# Patient Record
Sex: MALE | Employment: OTHER | URBAN - METROPOLITAN AREA
[De-identification: names, ages, dates, MRNs, and addresses within clinical notes are randomized per-mention and may not be internally consistent; named-entity substitution may affect disease eponyms.]

---

## 2019-06-12 NOTE — PROGRESS NOTES
HISTORY OF PRESENT ILLNESS  Brendan Sorensen is a 52 y.o. male. Mr. Owen Pena Sons presents to establish care and reports that he was hospitalized at [facility] and hospitals this past March. 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 He is presently completing physical therapy and Occupational Therapy at a [facility] facility. He also has a history of hypertension diabetes and reports that these were previously uncontrolled secondary to noncompliance with medication. Unfortunately access to his [facility] records is presently not available. Establish Care   The history is provided by the patient. Pertinent negatives include no chest pain, no abdominal pain, no headaches and no shortness of breath. Mr#: <[id number]>      Past Medical History:   Diagnosis Date    Chronic pain     right shoulder pain    Diabetes ([facility] [address]     Hypercholesterolemia     Hypertension     Stroke [facility])        History reviewed. No pertinent surgical history. Family History   Problem Relation Age of Onset    Hypertension Father     Diabetes Father     Diabetes Paternal Grandmother        No Known Allergies    Social History     Tobacco Use   Smoking Status Former Smoker   Smokeless Tobacco Never Used       Social History     Substance and Sexual Activity   Alcohol Use Not on file       Health Maintenance Review:  Tetanus immunization -  Influenza immunization -        There is no problem list on file for this patient. Current Outpatient Medications:     aspirin (ASPIRIN) 325 mg tablet, Take 325 mg by mouth daily. , Disp: , Rfl:     metformin HCl (METFORMIN PO), Take 500 mg by mouth two (2) times a day., Disp: , Rfl:     simvastatin (ZOCOR) 40 mg tablet, Take  by mouth nightly., Disp: , Rfl:     lisinopril (PRINIVIL, ZESTRIL) 40 mg tablet, Take 40 mg by mouth daily. , Disp: , Rfl:     glimepiride (AMARYL) 4 mg tablet, Take  by mouth every morning., Disp: , Rfl:         Review of Systems Constitutional: Negative for chills, fever and weight loss. HENT: Negative for congestion, hearing loss and sore throat. Eyes: Negative for blurred vision and double vision. Wears corrective lenses, no recent eye exam   Respiratory: Negative for cough, shortness of breath and wheezing. Cardiovascular: Negative for chest pain, palpitations and leg swelling. Gastrointestinal: Negative for abdominal pain, blood in stool, constipation, diarrhea, heartburn, melena, nausea and vomiting. Genitourinary: Negative for dysuria and urgency. Musculoskeletal: Positive for joint pain (right shoulder pain-hurt it 6-7 years ago, much worse since stroke 3 months ago). Negative for myalgias. Skin: Negative for itching and rash. Neurological: Positive for focal weakness (right arm and leg weakness, finishing PT, OT). Negative for dizziness, tingling, sensory change and headaches. Endo/Heme/Allergies: Negative for environmental allergies. Psychiatric/Behavioral: Negative for depression. The patient is not nervous/anxious and does not have insomnia. Visit Vitals  /80 (BP 1 Location: Left arm, BP Patient Position: Sitting)   Pulse 77   Temp 97.5 °F (36.4 °C) (Oral)   Resp 14   Ht 5' 9.17\" (1.757 m)   Wt 171 lb 12.8 oz (77.9 kg)   SpO2 96%   BMI 25.24 kg/m²       Physical Exam   Constitutional: He is oriented to person, place, and time. He appears well-developed and well-nourished. HENT:   Head: Normocephalic. Right Ear: Tympanic membrane and ear canal normal.   Left Ear: Tympanic membrane and ear canal normal.   Mouth/Throat: Oropharynx is clear and moist.   Eyes: Pupils are equal, round, and reactive to light. Conjunctivae and EOM are normal.   Neck: Neck supple. Cardiovascular: Normal rate, regular rhythm, normal heart sounds and intact distal pulses. Pulmonary/Chest: Effort normal and breath sounds normal.   Abdominal: Soft. Bowel sounds are normal. There is no tenderness. Musculoskeletal: He exhibits tenderness. He exhibits no edema. Right shoulder with full range of motion but with discomfort on full extension and with internal and external rotation well in abduction. There is also right upper parascapular tenderness. Neurological: He is alert and oriented to person, place, and time. He displays abnormal reflex ( Diminished deep tendon reflex at the left patellar tendon). Coordination normal.   Proximal and distal muscle strength right upper and lower extremity approximately 4/5   Skin: Skin is warm and dry. Psychiatric: He has a normal mood and affect. His behavior is normal.   Nursing note and vitals reviewed. Diabetic foot exam performed by Cherie Jaramillo MD     Measurement  Response Nurse Comment Physician Comment   Monofilament  R - normal sensation with micro filament  L - normal sensation with micro filament     Pulse DP R - 2+ (normal)  L - 2+ (normal)     Pulse TP R - 2+ (normal)  L - 1+ (weak)     Structural deformity R - None  L - None     Skin Integrity / Deformity R - None  L - None              Results for Otilio Rizzo (MRN <[id number]>) as of 6/13/2019 12:48   Ref. Range 6/13/2019 11:25   Hemoglobin A1c (POC) Latest Units: % 7.1       ASSESSMENT and PLAN    ICD-10-CM ICD-9-CM    1. Encounter to establish care Z76.89 V65.8    2. Type 2 diabetes mellitus without complication, without long-term current use of insulin ([facility]) E11.9 250.00 AMB POC HEMOGLOBIN A1C      MICROALBUMIN, UR, RAND W/ MICROALB/CREAT RATIO       DIABETES FOOT EXAM   3. Essential hypertension I10 401.9 LIPID PANEL      METABOLIC PANEL, BASIC   4. History of stroke Z86.73 V12.54    5. Impingement syndrome of right shoulder M75.41 726.2 REFERRAL TO PHYSICAL THERAPY      ibuprofen (MOTRIN) 600 mg tablet   6. Upper back pain on right side M54.9 724.5 REFERRAL TO PHYSICAL THERAPY      ibuprofen (MOTRIN) 600 mg tablet   7.  Encounter for immunization Z23 V03.89 TETANUS, DIPHTHERIA TOXOIDS AND ACELLULAR PERTUSSIS VACCINE (TDAP), IN INDIVIDS. >=7, IM      PNEUMOCOCCAL POLYSACCHARIDE VACCINE, 23-VALENT, ADULT OR IMMUNOSUPPRESSED PT DOSE,   Return for fasting lab, further disposition pending lab results. Continue current medications including lisinopril, metformin, glimepiride and simvastatin. Call back if names on medication bottles do not match these. Bring medication bottles to next appointment. Physical therapy referral.  Schedule a diabetic eye exam, have results sent here. Return for follow-up in 1 month, sooner with any problems.

## 2019-06-13 ENCOUNTER — OFFICE VISIT (OUTPATIENT)
Dept: FAMILY MEDICINE CLINIC | Age: 49
End: 2019-06-13

## 2019-06-13 VITALS
TEMPERATURE: 97.5 F | SYSTOLIC BLOOD PRESSURE: 102 MMHG | DIASTOLIC BLOOD PRESSURE: 80 MMHG | WEIGHT: 171.8 LBS | RESPIRATION RATE: 14 BRPM | BODY MASS INDEX: 25.45 KG/M2 | HEIGHT: 69 IN | OXYGEN SATURATION: 96 % | HEART RATE: 77 BPM

## 2019-06-13 DIAGNOSIS — Z86.73 HISTORY OF STROKE: ICD-10-CM

## 2019-06-13 DIAGNOSIS — Z23 ENCOUNTER FOR IMMUNIZATION: ICD-10-CM

## 2019-06-13 DIAGNOSIS — M75.41 IMPINGEMENT SYNDROME OF RIGHT SHOULDER: ICD-10-CM

## 2019-06-13 DIAGNOSIS — E11.9 TYPE 2 DIABETES MELLITUS WITHOUT COMPLICATION, WITHOUT LONG-TERM CURRENT USE OF INSULIN (HCC): ICD-10-CM

## 2019-06-13 DIAGNOSIS — I10 ESSENTIAL HYPERTENSION: ICD-10-CM

## 2019-06-13 DIAGNOSIS — M54.9 UPPER BACK PAIN ON RIGHT SIDE: ICD-10-CM

## 2019-06-13 DIAGNOSIS — Z76.89 ENCOUNTER TO ESTABLISH CARE: Primary | ICD-10-CM

## 2019-06-13 LAB — HBA1C MFR BLD HPLC: 7.1 %

## 2019-06-13 RX ORDER — IBUPROFEN 600 MG/1
600 TABLET ORAL
Qty: 45 TAB | Refills: 1 | Status: SHIPPED | OUTPATIENT
Start: 2019-06-13 | End: 2019-10-14

## 2019-06-13 RX ORDER — GLYBURIDE 5 MG/1
TABLET ORAL
COMMUNITY
End: 2019-06-13

## 2019-06-13 RX ORDER — GLIMEPIRIDE 4 MG/1
TABLET ORAL
COMMUNITY
End: 2019-07-11 | Stop reason: SDUPTHER

## 2019-06-13 RX ORDER — SIMVASTATIN 40 MG/1
TABLET, FILM COATED ORAL
COMMUNITY
End: 2019-07-11 | Stop reason: SDUPTHER

## 2019-06-13 RX ORDER — ASPIRIN 325 MG
325 TABLET ORAL DAILY
COMMUNITY
End: 2021-04-07

## 2019-06-13 RX ORDER — LISINOPRIL 40 MG/1
40 TABLET ORAL DAILY
COMMUNITY
End: 2019-07-11 | Stop reason: SDUPTHER

## 2019-06-13 NOTE — PROGRESS NOTES
Felicia Chong is a 52 y.o. male (: 1970) presenting to address:    Chief Complaint   Patient presents with   [facility]    Shoulder Pain     right shoulder old injury       Vitals:    19 1059   BP: 102/80   Pulse: 77   Resp: 14   Temp: 97.5 °F (36.4 °C)   TempSrc: Oral   SpO2: 96%   Weight: 171 lb 12.8 oz (77.9 kg)   Height: 5' 9.17\" (1.757 m)   PainSc:   8   PainLoc: Shoulder       Hearing/Vision:   No exam data present    Learning Assessment:     Learning Assessment 2019   PRIMARY LEARNER Patient   HIGHEST LEVEL OF EDUCATION - PRIMARY LEARNER  SOME COLLEGE   BARRIERS PRIMARY LEARNER NONE   CO-LEARNER CAREGIVER No   PRIMARY LANGUAGE ENGLISH    NEED No   LEARNER PREFERENCE PRIMARY DEMONSTRATION   ANSWERED BY patient   RELATIONSHIP SELF     Depression Screening:     3 most recent PHQ Screens 2019   Little interest or pleasure in doing things Not at all   Feeling down, depressed, irritable, or hopeless Not at all   Total Score PHQ 2 0     Fall Risk Assessment:     Fall Risk Assessment, last 12 mths 2019   Able to walk? Yes   Fall in past 12 months? No     Abuse Screening:     Abuse Screening Questionnaire 2019   Do you ever feel afraid of your partner? N   Are you in a relationship with someone who physically or mentally threatens you? N   Is it safe for you to go home? Y     Coordination of Care Questionaire:   1. Have you been to the ER, urgent care clinic since your last visit? Hospitalized since your last visit? YES; [facility] for stroke, 2019    2. Have you seen or consulted any other health care providers outside of the [address] [place] [address] since your last visit? Include any pap smears or colon screening. YES; physical therapy, speech and OT therapy    Advanced Directive:   1. Do you have an Advanced Directive? NO    2. Would you like information on Advanced Directives?  NO        Immunization/s of Tdap and Pneumovax 23 was administered 2019 by John Boles LPN/verified by Ramírez Enriquez LPN. Patient tolerated procedure well. No reactions noted.

## 2019-06-13 NOTE — PATIENT INSTRUCTIONS
Return for fasting lab, further disposition pending lab results. Continue current medications including lisinopril, metformin, glimepiride and simvastatin. Call back if names on medication bottles do not match these. Bring medication bottles to next appointment. Physical therapy referral. 
Schedule a diabetic eye exam, have results sent here. Return for follow-up in 1 month, sooner with any problems. Learning About Diabetes Food Guidelines Your Care Instructions Meal planning is important to manage diabetes. It helps keep your blood sugar at a target level (which you set with your doctor). You don't have to eat special foods. You can eat what your family eats, including sweets once in a while. But you do have to pay attention to how often you eat and how much you eat of certain foods. You may want to work with a dietitian or a certified diabetes educator (CDE) to help you plan meals and snacks. A dietitian or CDE can also help you lose weight if that is one of your goals. What should you know about eating carbs? Managing the amount of carbohydrate (carbs) you eat is an important part of healthy meals when you have diabetes. Carbohydrate is found in many foods. · Learn which foods have carbs. And learn the amounts of carbs in different foods. ? Bread, cereal, pasta, and rice have about 15 grams of carbs in a serving. A serving is 1 slice of bread (1 ounce), ½ cup of cooked cereal, or 1/3 cup of cooked pasta or rice. ? Fruits have 15 grams of carbs in a serving. A serving is 1 small fresh fruit, such as an apple or orange; ½ of a banana; ½ cup of cooked or canned fruit; ½ cup of fruit juice; 1 cup of melon or raspberries; or 2 tablespoons of dried fruit. ? Milk and no-sugar-added yogurt have 15 grams of carbs in a serving. A serving is 1 cup of milk or 2/3 cup of no-sugar-added yogurt. ? Starchy vegetables have 15 grams of carbs in a serving.  A serving is ½ cup of mashed potatoes or sweet potato; 1 cup winter squash; ½ of a small baked potato; ½ cup of cooked beans; or ½ cup cooked corn or green peas. · Learn how much carbs to eat each day and at each meal. A dietitian or CDE can teach you how to keep track of the amount of carbs you eat. This is called carbohydrate counting. · If you are not sure how to count carbohydrate grams, use the Plate Method to plan meals. It is a good, quick way to make sure that you have a balanced meal. It also helps you spread carbs throughout the day. ? Divide your plate by types of foods. Put non-starchy vegetables on half the plate, meat or other protein food on one-quarter of the plate, and a grain or starchy vegetable in the final quarter of the plate. To this you can add a small piece of fruit and 1 cup of milk or yogurt, depending on how many carbs you are supposed to eat at a meal. 
· Try to eat about the same amount of carbs at each meal. Do not \"save up\" your daily allowance of carbs to eat at one meal. 
· Proteins have very little or no carbs per serving. Examples of proteins are beef, chicken, turkey, fish, eggs, tofu, cheese, cottage cheese, and peanut butter. A serving size of meat is 3 ounces, which is about the size of a deck of cards. Examples of meat substitute serving sizes (equal to 1 ounce of meat) are 1/4 cup of cottage cheese, 1 egg, 1 tablespoon of peanut butter, and ½ cup of tofu. How can you eat out and still eat healthy? · Learn to estimate the serving sizes of foods that have carbohydrate. If you measure food at home, it will be easier to estimate the amount in a serving of restaurant food. · If the meal you order has too much carbohydrate (such as potatoes, corn, or baked beans), ask to have a low-carbohydrate food instead. Ask for a salad or green vegetables. · If you use insulin, check your blood sugar before and after eating out to help you plan how much to eat in the future. · If you eat more carbohydrate at a meal than you had planned, take a walk or do other exercise. This will help lower your blood sugar. What else should you know? · Limit saturated fat, such as the fat from meat and dairy products. This is a healthy choice because people who have diabetes are at higher risk of heart disease. So choose lean cuts of meat and nonfat or low-fat dairy products. Use olive or canola oil instead of butter or shortening when cooking. · Don't skip meals. Your blood sugar may drop too low if you skip meals and take insulin or certain medicines for diabetes. · Check with your doctor before you drink alcohol. Alcohol can cause your blood sugar to drop too low. Alcohol can also cause a bad reaction if you take certain diabetes medicines. Follow-up care is a key part of your treatment and safety. Be sure to make and go to all appointments, and call your doctor if you are having problems. It's also a good idea to know your test results and keep a list of the medicines you take. Where can you learn more? Go to http://lebron-judy.info/. Enter N089 in the search box to learn more about \"Learning About Diabetes Food Guidelines. \" Current as of: July 25, 2018 Content Version: 11.9 © 3086-3117 [employer]. Care instructions adapted under license by [employer] (which disclaims liability or warranty for this information). If you have questions about a medical condition or this instruction, always ask your healthcare professional. [address] any warranty or liability for your use of this information. Counting Carbohydrates: Care Instructions Your Care Instructions You don't have to eat special foods when you have diabetes. You just have to be careful to eat healthy foods. Carbohydrates (carbs) raise blood sugar higher and quicker than any other nutrient.  Carbs are found in desserts, breads and cereals, and fruit. They're also in starchy vegetables. These include potatoes, corn, and grains such as rice and pasta. Carbs are also in milk and yogurt. The more carbs you eat at one time, the higher your blood sugar will rise. Spreading carbs all through the day helps keep your blood sugar levels within your target range. Counting carbs is one of the best ways to keep your blood sugar under control. If you use insulin, counting carbs helps you match the right amount of insulin to the number of grams of carbs in a meal. Then you can change your diet and insulin dose as needed. Testing your blood sugar several times a day can help you learn how carbs affect your blood sugar. A registered dietitian or certified diabetes educator can help you plan meals and snacks. Follow-up care is a key part of your treatment and safety. Be sure to make and go to all appointments, and call your doctor if you are having problems. It's also a good idea to know your test results and keep a list of the medicines you take. How can you care for yourself at home? Know your daily amount of carbohydrates Your daily amount depends on several things, such as your weight, how active you are, which diabetes medicines you take, and what your goals are for your blood sugar levels. A registered dietitian or certified diabetes educator can help you plan how many carbs to include in each meal and snack. For most adults, a guideline for the daily amount of carbs is: · 45 to 60 grams at each meal. That's about the same as 3 to 4 carbohydrate servings. · 15 to 20 grams at each snack. That's about the same as 1 carbohydrate serving. Count carbs Counting carbs lets you know how much rapid-acting insulin to take before you eat. If you use an insulin pump, you get a constant rate of insulin during the day. So the pump must be programmed at meals. This gives you extra insulin to cover the rise in blood sugar after meals. If you take insulin: 
· Learn your own insulin-to-carb ratio. You and your diabetes health professional will figure out the ratio. You can do this by testing your blood sugar after meals. For example, you may need a certain amount of insulin for every 15 grams of carbs. · Add up the carb grams in a meal. Then you can figure out how many units of insulin to take based on your insulin-to-carb ratio. · Exercise lowers blood sugar. You can use less insulin than you would if you were not doing exercise. Keep in mind that timing matters. If you exercise within 1 hour after a meal, your body may need less insulin for that meal than it would if you exercised 3 hours after the meal. Test your blood sugar to find out how exercise affects your need for insulin. If you do or don't take insulin: 
· Look at labels on packaged foods. This can tell you how many carbs are in a serving. You can also use guides from the American Diabetes Association. · Be aware of portions, or serving sizes. If a package has two servings and you eat the whole package, you need to double the number of grams of carbohydrate listed for one serving. · Protein, fat, and fiber do not raise blood sugar as much as carbs do. If you eat a lot of these nutrients in a meal, your blood sugar will rise more slowly than it would otherwise. Eat from all food groups · Eat at least three meals a day. · Plan meals to include food from all the food groups. The food groups include grains, fruits, dairy, proteins, and vegetables. · Talk to your dietitian or diabetes educator about ways to add limited amounts of sweets into your meal plan. · If you drink alcohol, talk to your doctor. It may not be recommended when you are taking certain diabetes medicines. Where can you learn more? Go to http://lebron-judy.info/. Enter T818 in the search box to learn more about \"Counting Carbohydrates: Care Instructions. \" Current as of: July 25, 2018 Content Version: 11.9 © 1380-1816 [employer]. Care instructions adapted under license by [employer] (which disclaims liability or warranty for this information). If you have questions about a medical condition or this instruction, always ask your healthcare professional. [address] any warranty or liability for your use of this information.

## 2019-06-18 LAB
ANION GAP SERPL CALC-SCNC: 12 MMOL/L
BUN SERPL-MCNC: 14 MG/DL (ref 6–22)
CALCIUM SERPL-MCNC: 9.5 MG/DL (ref 8.4–10.4)
CHLORIDE SERPL-SCNC: 103 MMOL/L (ref 98–110)
CHOLEST SERPL-MCNC: 129 MG/DL (ref 110–200)
CO2 SERPL-SCNC: 25 MMOL/L (ref 20–32)
CREAT SERPL-MCNC: 0.8 MG/DL (ref 0.5–1.2)
GFRAA, 66117: >60
GFRNA, 66118: >60
GLUCOSE SERPL-MCNC: 174 MG/DL (ref 70–99)
HDLC SERPL-MCNC: 29 MG/DL (ref 40–59)
HDLC SERPL-MCNC: 4.4 MG/DL (ref 0–5)
LDLC SERPL CALC-MCNC: 71 MG/DL (ref 50–99)
POTASSIUM SERPL-SCNC: 4.6 MMOL/L (ref 3.5–5.5)
SODIUM SERPL-SCNC: 140 MMOL/L (ref 133–145)
TRIGL SERPL-MCNC: 146 MG/DL (ref 40–149)
VLDLC SERPL CALC-MCNC: 29 MG/DL (ref 8–30)

## 2019-06-19 NOTE — PROGRESS NOTES
Please advise that lab results show cholesterol levels are satisfactory on current medication which should be continued. Still need a urine specimen for urine microalbumin.

## 2019-07-10 PROBLEM — Z86.73 HISTORY OF STROKE: Status: ACTIVE | Noted: 2019-07-10

## 2019-07-10 PROBLEM — E11.9 TYPE 2 DIABETES MELLITUS WITHOUT COMPLICATION, WITHOUT LONG-TERM CURRENT USE OF INSULIN (HCC): Status: ACTIVE | Noted: 2019-07-10

## 2019-07-10 PROBLEM — I10 ESSENTIAL HYPERTENSION: Status: ACTIVE | Noted: 2019-07-10

## 2019-07-10 NOTE — PROGRESS NOTES
HISTORY OF PRESENT ILLNESS  Jim Joyner is a 52 y.o. male. Mr. Jodie Blackmon was seen for the first time on 4/13/2019 with a prior history of hospitalization for stroke 4 months ago and with a history of type 2 diabetes and hypertension. Diabetes was found to be in reasonable control with a hemoglobin A1c of 7.1%. He also reported right shoulder pain and his exam was consistent with shoulder impingement syndrome. He was referred for physical therapy. Neurologic Problem   The history is provided by the patient and medical records. This is a new problem. Primary symptoms include focal weakness (some residual right side weakness). Pertinent negatives include no chest pain and no headaches. Diabetes   The history is provided by the patient and medical records. This is a chronic problem. Pertinent negatives include no chest pain and no headaches. Review of Systems   Constitutional: Negative for fever and weight loss. Eyes: Negative for blurred vision. Cardiovascular: Negative for chest pain and palpitations. Genitourinary: Negative for frequency. Musculoskeletal: Positive for joint pain ( Right shoulder pain x 7 years after pushing a door, more frequent and intense since stroke). Neurological: Positive for speech change (some residual slurring of speech) and focal weakness (some residual right side weakness). Negative for dizziness, tingling and headaches. Difficulty with fine motor control right hand   Endo/Heme/Allergies: Negative for polydipsia. Visit Vitals  /80 (BP 1 Location: Left arm, BP Patient Position: Sitting)   Pulse 85   Temp 97.4 °F (36.3 °C) (Oral)   Resp 14   Ht 5' 9.17\" (1.757 m)   Wt 173 lb 12.8 oz (78.8 kg)   SpO2 98%   BMI 25.54 kg/m²       Physical Exam   Constitutional: He is oriented to person, place, and time. He appears well-developed and well-nourished. HENT:   Head: Normocephalic. Eyes: EOM are normal.   Neck: Neck supple.    Cardiovascular: Normal rate, regular rhythm and normal heart sounds. Pulmonary/Chest: Effort normal and breath sounds normal.   Musculoskeletal: He exhibits no edema. Right shoulder non tender with full range of motion with pain on extension. Rotator cuff muscle group strength difficult to assess against resistance in the context of recent stroke. Neurological: He is alert and oriented to person, place, and time. Skin: Skin is warm and dry. Psychiatric: He has a normal mood and affect. His behavior is normal.   Nursing note and vitals reviewed. Results for Tushar Christianson (MRN <[id number]>) as of 7/10/2019 07:31   Ref. Range 6/13/2019 11:25 6/18/2019 07:31   Sodium Latest Ref Range: 133 - 145 mmol/L  140   Potassium Latest Ref Range: 3.5 - 5.5 mmol/L  4.6   Chloride Latest Ref Range: 98 - 110 mmol/L  103   CO2 Latest Ref Range: 20 - 32 mmol/L  25   Anion gap Latest Units: mmol/L  12.0   Glucose Latest Ref Range: 70 - 99 mg/dL  174 (H)   BUN Latest Ref Range: 6 - 22 mg/dL  14   Creatinine Latest Ref Range: 0.5 - 1.2 mg/dL  0.8   Calcium Latest Ref Range: 8.4 - 10.4 mg/dL  9.5   Triglyceride Latest Ref Range: 40 - 149 mg/dL  146   Cholesterol, total Latest Ref Range: 110 - 200 mg/dL  129   HDL Cholesterol Latest Ref Range: 40 - 59 mg/dL  29 (L)   CHOLESTEROL/HDL Latest Ref Range: 0.0 - 5.0   4.4   LDL, calculated Latest Ref Range: 50 - 99 mg/dL  71   VLDL, calculated Latest Ref Range: 8 - 30 mg/dL  29   Hemoglobin A1c (POC) Latest Units: % 7.1    Results for Tushar Christianson (MRN <[id number]>) as of 7/11/2019 07:59   Ref. Range 7/11/2019 07:38   Microalbumin urine (POC) Latest Units: MG/L 30   Microalbumin/creat ratio (POC) Latest Ref Range: <30 MG/G <30     ASSESSMENT and PLAN    ICD-10-CM ICD-9-CM    1.  Type 2 diabetes mellitus without complication, without long-term current use of insulin ([facility]) E11.9 250.00 AMB POC URINE, MICROALBUMIN, SEMIQUANTITATIVE      metFORMIN (GLUCOPHAGE) 500 mg tablet      simvastatin (ZOCOR) 40 mg tablet glimepiride (AMARYL) 4 mg tablet   2. Essential hypertension I10 401.9 lisinopril (PRINIVIL, ZESTRIL) 40 mg tablet   3. History of stroke Z86.73 V12.54 simvastatin (ZOCOR) 40 mg tablet   4.  Chronic right shoulder pain M25.511 719.41 REFERRAL TO ORTHOPEDIC SURGERY    G89.29 338.29    Diabetes and hypertension well controlled, lipid levels at goal, reports continued progress with recovery following stroke  Continue current medications  Orthopedic surgery referral  Return for follow-up in 3 months, A1c at follow-up

## 2019-07-11 ENCOUNTER — OFFICE VISIT (OUTPATIENT)
Dept: FAMILY MEDICINE CLINIC | Age: 49
End: 2019-07-11

## 2019-07-11 VITALS
RESPIRATION RATE: 14 BRPM | WEIGHT: 173.8 LBS | OXYGEN SATURATION: 98 % | TEMPERATURE: 97.4 F | BODY MASS INDEX: 25.74 KG/M2 | HEIGHT: 69 IN | SYSTOLIC BLOOD PRESSURE: 118 MMHG | DIASTOLIC BLOOD PRESSURE: 80 MMHG | HEART RATE: 85 BPM

## 2019-07-11 DIAGNOSIS — G89.29 CHRONIC RIGHT SHOULDER PAIN: ICD-10-CM

## 2019-07-11 DIAGNOSIS — I10 ESSENTIAL HYPERTENSION: ICD-10-CM

## 2019-07-11 DIAGNOSIS — M25.511 CHRONIC RIGHT SHOULDER PAIN: ICD-10-CM

## 2019-07-11 DIAGNOSIS — Z86.73 HISTORY OF STROKE: ICD-10-CM

## 2019-07-11 DIAGNOSIS — E11.9 TYPE 2 DIABETES MELLITUS WITHOUT COMPLICATION, WITHOUT LONG-TERM CURRENT USE OF INSULIN (HCC): Primary | ICD-10-CM

## 2019-07-11 LAB
MICROALBUMIN UR TEST STR-MCNC: 30 MG/L
MICROALBUMIN/CREAT RATIO POC: <30 MG/G

## 2019-07-11 RX ORDER — LISINOPRIL 40 MG/1
40 TABLET ORAL DAILY
Qty: 90 TAB | Refills: 3 | Status: SHIPPED | OUTPATIENT
Start: 2019-07-11 | End: 2020-07-02

## 2019-07-11 RX ORDER — SIMVASTATIN 40 MG/1
40 TABLET, FILM COATED ORAL
Qty: 90 TAB | Refills: 3 | Status: SHIPPED | OUTPATIENT
Start: 2019-07-11 | End: 2020-02-10 | Stop reason: ALTCHOICE

## 2019-07-11 RX ORDER — METFORMIN HYDROCHLORIDE 500 MG/1
500 TABLET ORAL 2 TIMES DAILY WITH MEALS
Qty: 180 TAB | Refills: 3 | Status: SHIPPED | OUTPATIENT
Start: 2019-07-11 | End: 2020-07-02

## 2019-07-11 RX ORDER — GLIMEPIRIDE 4 MG/1
4 TABLET ORAL
Qty: 90 TAB | Refills: 3 | Status: SHIPPED | OUTPATIENT
Start: 2019-07-11 | End: 2020-07-01

## 2019-07-11 NOTE — PATIENT INSTRUCTIONS
Continue current medications Orthopedic surgery referral 
Return for follow-up in 3 months, A1c at follow-up Learning About Type 2 Diabetes What is type 2 diabetes? Insulin is a hormone that helps your body use sugar from your food as energy. Type 2 diabetes happens when your body can't use insulin the right way. Over time, the pancreas can't make enough insulin. If you don't have enough insulin, too much sugar stays in your blood. If you are overweight, get little or no exercise, or have type 2 diabetes in your family, you are more likely to have problems with the way insulin works in your body.  Americans, Hispanics, Native Americans,  Americans, and Pacific Islanders have a higher risk for type 2 diabetes. Type 2 diabetes can be prevented or delayed with a healthy lifestyle, which includes staying at a healthy weight, making smart food choices, and getting regular exercise. What can you expect with type 2 diabetes? Gillermo Kanner keep hearing about how important it is to keep your blood sugar within a target range. That's because over time, high blood sugar can lead to serious problems. It can: 
· Harm your eyes, nerves, and kidneys. · Damage your blood vessels, leading to heart disease and stroke. · Reduce blood flow and cause nerve damage to parts of your body, especially your feet. This can cause slow healing and pain when you walk. · Make your immune system weak and less able to fight infections. When people hear the word \"diabetes,\" they often think of problems like these. But daily care and treatment can help prevent or delay these problems. The goal is to keep your blood sugar in a target range. That's the best way to reduce your chance of having more problems from diabetes. What are the symptoms? Some people who have type 2 diabetes may not have any symptoms early on.  Many people with the disease don't even know they have it at first. But with time, diabetes starts to cause symptoms. You experience most symptoms of type 2 diabetes when your blood sugar is either too high or too low. The most common symptoms of high blood sugar include: · Thirst. 
· Frequent urination. · Weight loss. · Blurry vision. The symptoms of low blood sugar include: · Sweating. · Shakiness. · Weakness. · Hunger. · Confusion. How can you prevent type 2 diabetes? The best way to prevent or delay type 2 diabetes is to adopt healthy habits, which include: 
· Staying at a healthy weight. · Exercising regularly. · Eating healthy foods. How is type 2 diabetes treated? If you have type 2 diabetes, here are the most important things you can do. · Take your diabetes medicines. · Check your blood sugar as often as your doctor recommends. Also, get a hemoglobin A1c test at least every 6 months. · Try to eat a variety of foods and to spread carbohydrate throughout the day. Carbohydrate raises blood sugar higher and more quickly than any other nutrient does. Carbohydrate is found in sugar, breads and cereals, fruit, starchy vegetables such as potatoes and corn, and milk and yogurt. · Get at least 30 minutes of exercise on most days of the week. Walking is a good choice. You also may want to do other activities, such as running, swimming, cycling, or playing tennis or team sports. If your doctor says it's okay, do muscle-strengthening exercises at least 2 times a week. · See your doctor for checkups and tests on a regular schedule. · If you have high blood pressure or high cholesterol, take the medicines as prescribed by your doctor. · Do not smoke. Smoking can make health problems worse. This includes problems you might have with type 2 diabetes. If you need help quitting, talk to your doctor about stop-smoking programs and medicines. These can increase your chances of quitting for good. Follow-up care is a key part of your treatment and safety.  Be sure to make and go to all appointments, and call your doctor if you are having problems. It's also a good idea to know your test results and keep a list of the medicines you take. Where can you learn more? Go to http://lebron-judy.info/. Enter P760 in the search box to learn more about \"Learning About Type 2 Diabetes. \" Current as of: July 25, 2018 Content Version: 11.9 © 2419-0582 [employer]. Care instructions adapted under license by [employer] (which disclaims liability or warranty for this information). If you have questions about a medical condition or this instruction, always ask your healthcare professional. [address] any warranty or liability for your use of this information.

## 2019-07-11 NOTE — PROGRESS NOTES
Mike Dowling is a 52 y.o. male (: 1970) presenting to address:    Chief Complaint   Patient presents with    Ischemic Stroke     f/u stroke on 3/17/19       Vitals:    19 0742   Weight: 173 lb 12.8 oz (78.8 kg)   Height: 5' 9.17\" (1.757 m)   PainSc:   6   PainLoc: Shoulder       Hearing/Vision:   No exam data present    Learning Assessment:     Learning Assessment 2019   PRIMARY LEARNER Patient   HIGHEST LEVEL OF EDUCATION - PRIMARY LEARNER  SOME COLLEGE   BARRIERS PRIMARY LEARNER NONE   CO-LEARNER CAREGIVER No   PRIMARY LANGUAGE ENGLISH    NEED No   LEARNER PREFERENCE PRIMARY DEMONSTRATION   ANSWERED BY patient   RELATIONSHIP SELF     Depression Screening:     3 most recent PHQ Screens 2019   Little interest or pleasure in doing things Not at all   Feeling down, depressed, irritable, or hopeless Not at all   Total Score PHQ 2 0     Fall Risk Assessment:     Fall Risk Assessment, last 12 mths 2019   Able to walk? Yes   Fall in past 12 months? No     Abuse Screening:     Abuse Screening Questionnaire 2019   Do you ever feel afraid of your partner? N   Are you in a relationship with someone who physically or mentally threatens you? N   Is it safe for you to go home? Y     Coordination of Care Questionaire:   1. Have you been to the ER, urgent care clinic since your last visit? Hospitalized since your last visit? NO    2. Have you seen or consulted any other health care providers outside of the [address] [place] [address] since your last visit? Include any pap smears or colon screening. NO    Advanced Directive:   1. Do you have an Advanced Directive? NO    2. Would you like information on Advanced Directives?  NO

## 2019-07-23 ENCOUNTER — OFFICE VISIT (OUTPATIENT)
Dept: ORTHOPEDIC SURGERY | Facility: CLINIC | Age: 49
End: 2019-07-23

## 2019-07-23 VITALS
WEIGHT: 177 LBS | HEIGHT: 69 IN | SYSTOLIC BLOOD PRESSURE: 136 MMHG | RESPIRATION RATE: 16 BRPM | HEART RATE: 102 BPM | DIASTOLIC BLOOD PRESSURE: 90 MMHG | TEMPERATURE: 96.3 F | OXYGEN SATURATION: 99 % | BODY MASS INDEX: 26.22 KG/M2

## 2019-07-23 DIAGNOSIS — M25.511 RIGHT SHOULDER PAIN, UNSPECIFIED CHRONICITY: ICD-10-CM

## 2019-07-23 DIAGNOSIS — M19.011 PRIMARY OSTEOARTHRITIS OF RIGHT SHOULDER: Primary | ICD-10-CM

## 2019-07-23 RX ORDER — TRIAMCINOLONE ACETONIDE 40 MG/ML
40 INJECTION, SUSPENSION INTRA-ARTICULAR; INTRAMUSCULAR ONCE
Qty: 1 VIAL | Refills: 0
Start: 2019-07-23 | End: 2019-07-23

## 2019-07-23 NOTE — PROGRESS NOTES
1. Have you been to the ER, urgent care clinic since your last visit? Hospitalized since your last visit? No    2. Have you seen or consulted any other health care providers outside of the [address] [place] [address] since your last visit? Include any pap smears or colon screening. Yes, Marquis Olvera., Pt states he cannot remember the name of doctor or name of practice.

## 2019-07-23 NOTE — PROGRESS NOTES
Patient: Mary Mayfield                MRN: [id number]       SSN: [id number]  YOB: 1970        AGE: 52 y.o. SEX: male  Body mass index is 26.01 kg/m². PCP: Zoya Holloway MD  07/23/19    Chief Complaint: Right shoulder pain    HISTORY OF PRESENT ILLNESS:  John Salguero is a 52year-old male who presents to the office today with right shoulder pain. He has had pain for years. He says it has worsened over the past six months. He did have a stroke six months ago affecting his right side. He has difficulty with motion in his shoulder. The pain is a deep achy pain. He had physical therapy, which he said made it worse. He has not had any injections or advanced imaging. Past Medical History:   Diagnosis Date    Chronic pain     right shoulder pain    Diabetes ([address]     Hypercholesterolemia     Hypertension     Stroke [facility])        Family History   Problem Relation Age of Onset    Hypertension Father     Diabetes Father     Diabetes Paternal Grandmother        Current Outpatient Medications   Medication Sig Dispense Refill    metFORMIN (GLUCOPHAGE) 500 mg tablet Take 1 Tab by mouth two (2) times daily (with meals). 180 Tab 3    simvastatin (ZOCOR) 40 mg tablet Take 1 Tab by mouth nightly. 90 Tab 3    lisinopril (PRINIVIL, ZESTRIL) 40 mg tablet Take 1 Tab by mouth daily. 90 Tab 3    glimepiride (AMARYL) 4 mg tablet Take 1 Tab by mouth every morning. 90 Tab 3    aspirin (ASPIRIN) 325 mg tablet Take 325 mg by mouth daily.  ibuprofen (MOTRIN) 600 mg tablet Take 1 Tab by mouth every eight (8) hours as needed for Pain. 45 Tab 1       No Known Allergies    History reviewed. No pertinent surgical history.     Social History     Socioeconomic History    Marital status: UNKNOWN     Spouse name: Not on file    Number of children: Not on file    Years of education: Not on file    Highest education level: Not on file   Occupational History    Not on file   Social Needs    Financial resource strain: Not on file    Food insecurity:     Worry: Not on file     Inability: Not on file    Transportation needs:     Medical: Not on file     Non-medical: Not on file   Tobacco Use    Smoking status: Former Smoker    Smokeless tobacco: Never Used   Substance and Sexual Activity    Alcohol use: Not Currently    Drug use: Never    Sexual activity: Not Currently     Partners: Female   Lifestyle    Physical activity:     Days per week: Not on file     Minutes per session: Not on file    Stress: Not on file   Relationships    Social connections:     Talks on phone: Not on file     Gets together: Not on file     Attends [employer] service: Not on file     Active member of club or organization: Not on file     Attends meetings of clubs or organizations: Not on file     Relationship status: Not on file    Intimate partner violence:     Fear of current or ex partner: Not on file     Emotionally abused: Not on file     Physically abused: Not on file     Forced sexual activity: Not on file   Other Topics Concern    Not on file   Social History Narrative    Not on file       REVIEW OF SYSTEMS:      CON: negative for recent weight loss/gain, fever, or chills  EYE: negative for double or blurry vision  ENT: negative for hoarseness  RS:   negative for cough, URI, SOB  CV:  negative for chest pain, palpitations  GI:    negative for blood in stool, nausea/vomiting  :  negative for blood in urine  MS: As per HPI  Other systems reviewed and noted below. PHYSICAL EXAMINATION:  Visit Vitals  /90 (BP 1 Location: Left arm, BP Patient Position: Sitting)   Pulse (!) 102   Temp 96.3 °F (35.7 °C) (Oral)   Resp 16   Ht 5' 9.17\" (1.757 m)   Wt 177 lb (80.3 kg)   SpO2 99%   BMI 26.01 kg/m²     Body mass index is 26.01 kg/m². GENERAL: Alert and oriented x3, in no acute distress, well-developed, well-nourished. HEENT: Normocephalic, atraumatic.     RESP: Non labored breathing with equal chest rise on inspiration. CV: Well perfused extremities. No cyanosis or clubbing noted. ABDOMEN: Soft, non-tender, non-distended. PHYSICAL EXAM:  Physical exam of the right shoulder with decreased range of motion, forward flexion and external rotation and internal rotation. Gentle rotator cuff strength testing does not reveal any weakness. He is neurovascularly intact distally. IMAGING:  X-rays of the right shoulder were reviewed in the office today. These show an inferior humeral osteophyte and mild arthritic changes in the shoulder. ASSESSMENT AND PLAN:   Fred Gibbons is a 52year-old male with right shoulder arthritis. I think this is the cause of his pain and limitation of his motion. I recommended a cortisone shot today. I will see him back as needed.         [facility]  OFFICE PROCEDURE PROGRESS NOTE        Chart reviewed for the following:   George Honeycutt MD, have reviewed the History, Physical and updated the Allergic reactions for [address] performed immediately prior to start of procedure:   George Honeycutt MD, have performed the following reviews on Lashae Grullon prior to the start of the procedure:            * Patient was identified by name and date of birth   * Agreement on procedure being performed was verified  * Risks and Benefits explained to the patient  * Procedure site verified and marked as necessary  * Patient was positioned for comfort  * Consent was signed and verified    Time: 1515      Date of procedure: 7/23/2019    Procedure performed by:  Ainsley Covarrubias MD    Provider assisted by: Cindi Vargas LPN    Patient assisted by: self    How tolerated by patient: tolerated the procedure well with no complications    Post Procedural Pain Scale: 0 - No Hurt    Comments: none                  Electronically signed by: Ainsley Covarrubias MD

## 2019-10-13 NOTE — PROGRESS NOTES
HISTORY OF PRESENT ILLNESS  Elias Caba is a 52 y.o. male. 72-year-old male with a history of type 2 diabetes, hypertension, prior history of stroke previously referred for orthopedic surgery evaluation of chronic right shoulder pain resulting in a corticosteroid injection. Diabetes   The history is provided by the patient and medical records. This is a chronic problem. Pertinent negatives include no chest pain, no abdominal pain and no shortness of breath. Mr#: <[id number]>      Patient Active Problem List   Diagnosis Code    Type 2 diabetes mellitus without complication, without long-term current use of insulin ([address] E11.9    Essential hypertension I10    History of stroke Z86.73     Immunization History   Administered Date(s) Administered    Pneumococcal Polysaccharide (PPSV-23) 06/13/2019    Tdap 06/13/2019         Current Outpatient Medications:     metFORMIN (GLUCOPHAGE) 500 mg tablet, Take 1 Tab by mouth two (2) times daily (with meals). , Disp: 180 Tab, Rfl: 3    simvastatin (ZOCOR) 40 mg tablet, Take 1 Tab by mouth nightly., Disp: 90 Tab, Rfl: 3    lisinopril (PRINIVIL, ZESTRIL) 40 mg tablet, Take 1 Tab by mouth daily. , Disp: 90 Tab, Rfl: 3    glimepiride (AMARYL) 4 mg tablet, Take 1 Tab by mouth every morning., Disp: 90 Tab, Rfl: 3    aspirin (ASPIRIN) 325 mg tablet, Take 325 mg by mouth daily. , Disp: , Rfl:     ibuprofen (MOTRIN) 600 mg tablet, Take 1 Tab by mouth every eight (8) hours as needed for Pain., Disp: 45 Tab, Rfl: 1     No Known Allergies      Review of Systems   Constitutional: Negative for fever and weight loss. Eyes:        Eye exam 9/2019 - [facility]   Respiratory: Negative for shortness of breath. Cardiovascular: Negative for chest pain and palpitations. Gastrointestinal: Negative for abdominal pain. Genitourinary: Negative for frequency. Musculoskeletal: Positive for joint pain ( Right shoulder pain).    Neurological: Positive for tingling (feet, improving). Negative for sensory change, speech change and focal weakness. Fine motor right hand almost back to baseline, not 100%   Endo/Heme/Allergies: Negative for polydipsia. Visit Vitals  /64 (BP 1 Location: Left arm, BP Patient Position: Sitting)   Pulse (!) 50   Temp 97.8 °F (36.6 °C) (Oral)   Resp 14   Ht 5' 9.17\" (1.757 m)   Wt 178 lb 3.2 oz (80.8 kg)   SpO2 99%   BMI 26.19 kg/m²       Physical Exam   Constitutional: He is oriented to person, place, and time. He appears well-developed and well-nourished. HENT:   Head: Normocephalic. Eyes: EOM are normal.   Neck: Neck supple. Cardiovascular: Normal rate, regular rhythm and normal heart sounds. Pulmonary/Chest: Effort normal and breath sounds normal.   Musculoskeletal: He exhibits no edema. Right shoulder non tender with limited extension with pain on extension, abduction, internal and external rotation. Rotator cuff muscle group strength difficult to assess because of pain associated with testing against resistance. Neurological: He is alert and oriented to person, place, and time. Skin: Skin is warm and dry. Psychiatric: He has a normal mood and affect. His behavior is normal.   Nursing note and vitals reviewed. Results for Sudhir Lambert (MRN <[id number]>) as of 10/14/2019 09:43   Ref.  Range 6/13/2019 11:25 6/18/2019 07:31 7/11/2019 07:38 7/23/2019 14:53 10/14/2019 08:22   Triglyceride Latest Ref Range: 40 - 149 mg/dL  146      Cholesterol, total Latest Ref Range: 110 - 200 mg/dL  129      HDL Cholesterol Latest Ref Range: 40 - 59 mg/dL  29 (L)      CHOLESTEROL/HDL Latest Ref Range: 0.0 - 5.0   4.4      LDL, calculated Latest Ref Range: 50 - 99 mg/dL  71      VLDL, calculated Latest Ref Range: 8 - 30 mg/dL  29      Hemoglobin A1c (POC) Latest Units: % 7.1    5.7   Microalbumin urine (POC) Latest Units: MG/L   30     Microalbumin/creat ratio (POC) Latest Ref Range: <30 MG/G   <30       ASSESSMENT and PLAN ICD-10-CM ICD-9-CM    1. Type 2 diabetes mellitus without complication, without long-term current use of insulin (HCC) E11.9 250.00 AMB POC HEMOGLOBIN A1C      LIPID PANEL      HEMOGLOBIN A1C WITH EAG   2. Essential hypertension Y41 277.9 METABOLIC PANEL, BASIC   3. History of stroke Z86.73 V12.54    4. Chronic right shoulder pain M25.511 719.41 MRI SHOULDER RT WO CONT    G89.29 338.29    Further disposition pending right shoulder MRI results  Please sign a release for diabetic eye exam results Continue current medications  Avoid dietary salt, starch and sugar and follow a program of regular aerobic exercise  Continue current medications  Return for follow-up in 4 months with fasting lab several days prior to the appointment.   Follow-up sooner with any problems

## 2019-10-14 ENCOUNTER — OFFICE VISIT (OUTPATIENT)
Dept: FAMILY MEDICINE CLINIC | Age: 49
End: 2019-10-14

## 2019-10-14 VITALS
RESPIRATION RATE: 14 BRPM | BODY MASS INDEX: 26.39 KG/M2 | WEIGHT: 178.2 LBS | SYSTOLIC BLOOD PRESSURE: 114 MMHG | OXYGEN SATURATION: 99 % | TEMPERATURE: 97.8 F | DIASTOLIC BLOOD PRESSURE: 64 MMHG | HEART RATE: 50 BPM | HEIGHT: 69 IN

## 2019-10-14 DIAGNOSIS — I10 ESSENTIAL HYPERTENSION: ICD-10-CM

## 2019-10-14 DIAGNOSIS — E11.9 TYPE 2 DIABETES MELLITUS WITHOUT COMPLICATION, WITHOUT LONG-TERM CURRENT USE OF INSULIN (HCC): Primary | ICD-10-CM

## 2019-10-14 DIAGNOSIS — E11.9 TYPE 2 DIABETES MELLITUS WITHOUT COMPLICATION, WITHOUT LONG-TERM CURRENT USE OF INSULIN (HCC): ICD-10-CM

## 2019-10-14 DIAGNOSIS — G89.29 CHRONIC RIGHT SHOULDER PAIN: ICD-10-CM

## 2019-10-14 DIAGNOSIS — Z86.73 HISTORY OF STROKE: ICD-10-CM

## 2019-10-14 DIAGNOSIS — M25.511 CHRONIC RIGHT SHOULDER PAIN: ICD-10-CM

## 2019-10-14 LAB — HBA1C MFR BLD HPLC: 5.7 %

## 2019-10-14 NOTE — PATIENT INSTRUCTIONS
Further disposition pending right shoulder MRI results Please sign a release for diabetic eye exam results Continue current medications Avoid dietary salt, starch and sugar and follow a program of regular aerobic exercise Continue current medications Return for follow-up in 4 months with fasting lab several days prior to the appointment. Follow-up sooner with any problems Nutrition Tips for Diabetes: After Your Visit Your Care Instructions A healthy diet is important to manage diabetes. It helps you lose weight (if you need to) and keep it off. It gives you the nutrition and energy your body needs and helps prevent heart disease. But a diet for diabetes does not mean that you have to eat special foods. You can eat what your family eats, including occasional sweets and other favorites. But you do have to pay attention to how often you eat and how much you eat of certain foods. The right plan for you will give you meals that help you keep your blood sugar at healthy levels. Try to eat a variety of foods and to spread carbohydrate throughout the day. Carbohydrate raises blood sugar higher and more quickly than any other nutrient does. Carbohydrate is found in sugar, breads and cereals, fruit, starchy vegetables such as potatoes and corn, and milk and yogurt. You may want to work with a dietitian or diabetes educator to help you plan meals and snacks. A dietitian or diabetes educator also can help you lose weight if that is one of your goals. The following tips can help you enjoy your meals and stay healthy. Follow-up care is a key part of your treatment and safety. Be sure to make and go to all appointments, and call your doctor if you are having problems. Its also a good idea to know your test results and keep a list of the medicines you take. How can you care for yourself at home? · Learn which foods have carbohydrate and how much carbohydrate to eat.  A dietitian or diabetes educator can help you learn to keep track of how much carbohydrate you eat. · Spread carbohydrate throughout the day. Eat some carbohydrate at all meals, but do not eat too much at any one time. · Plan meals to include food from all the food groups. These are the food groups and some example portion sizes: ¨ Grains: 1 slice of bread (1 ounce), ½ cup of cooked cereal, and 1/3 cup of cooked pasta or rice. These have about 15 grams of carbohydrate in a serving. Choose whole grains such as whole wheat bread or crackers, oatmeal, and brown rice more often than refined grains. ¨ Fruit: 1 small fresh fruit, such as an apple or orange; ½ of a banana; ½ cup of chopped, cooked, or canned fruit; ½ cup of fruit juice; 1 cup of melon or raspberries; and 2 tablespoons of dried fruit. These have about 15 grams of carbohydrate in a serving. ¨ Dairy: 1 cup of nonfat or low-fat milk and 2/3 cup of plain yogurt. These have about 15 grams of carbohydrate in a serving. ¨ Protein foods: Beef, chicken, turkey, fish, eggs, tofu, cheese, cottage cheese, and peanut butter. A serving size of meat is 3 ounces, which is about the size of a deck of cards. Examples of meat substitute serving sizes (equal to 1 ounce of meat) are 1/4 cup of cottage cheese, 1 egg, 1 tablespoon of peanut butter, and ½ cup of tofu. These have very little or no carbohydrate per serving. ¨ Vegetables: Starchy vegetables such as ½ cup of cooked dried beans, peas, potatoes, or corn have about 15 grams of carbohydrate. Nonstarchy vegetables have very little carbohydrate, such as 1 cup of raw leafy vegetables (such as spinach), ½ cup of other vegetables (cooked or chopped), and 3/4 cup of vegetable juice. · Use the plate format to plan meals. It is a good, quick way to make sure that you have a balanced meal. It also helps you spread carbohydrate throughout the day. You divide your plate by types of foods.  Put vegetables on half the plate, meat or meat substitutes on one-quarter of the plate, and a grain or starchy vegetable (such as brown rice or a potato) in the final quarter of the plate. To this you can add a small piece of fruit and 1 cup of milk or yogurt, depending on how much carbohydrate you are supposed to eat at a meal. 
· Talk to your dietitian or diabetes educator about ways to add limited amounts of sweets into your meal plan. You can eat these foods now and then, as long as you include the amount of carbohydrate they have in your daily carbohydrate allowance. · If you drink alcohol, limit it to no more than 1 drink a day for women and 2 drinks a day for men. If you are pregnant, no amount of alcohol is known to be safe. · Protein, fat, and fiber do not raise blood sugar as much as carbohydrate does. If you eat a lot of these nutrients in a meal, your blood sugar will rise more slowly than it would otherwise. · Limit saturated fats, such as those from meat and dairy products. Try to replace it with monounsaturated fat, such as olive oil. This is a healthier choice because people who have diabetes are at higher-than-average risk of heart disease. But use a modest amount of olive oil. A tablespoon of olive oil has 14 grams of fat and 120 calories. · Exercise lowers blood sugar. If you take insulin by shots or pump, you can use less than you would if you were not exercising. Keep in mind that timing matters. If you exercise within 1 hour after a meal, your body may need less insulin for that meal than it would if you exercised 3 hours after the meal. Test your blood sugar to find out how exercise affects your need for insulin. · Exercise on most days of the week. Aim for at least 30 minutes. Exercise helps you stay at a healthy weight and helps your body use insulin. Walking is an easy way to get exercise.  Gradually increase the amount you walk every day. You also may want to swim, bike, or do other activities. When you eat out · Learn to estimate the serving sizes of foods that have carbohydrate. If you measure food at home, it will be easier to estimate the amount in a serving of restaurant food. · If the meal you order has too much carbohydrate (such as potatoes, corn, or baked beans), ask to have a low-carbohydrate food instead. Ask for a salad or green vegetables. · If you use insulin, check your blood sugar before and after eating out to help you plan how much to eat in the future. · If you eat more carbohydrate at a meal than you had planned, take a walk or do other exercise. This will help lower your blood sugar. Where can you learn more? Go to [employer] Enter T275 in the search box to learn more about \"Nutrition Tips for Diabetes: After Your Visit. \"  
© 2460-2039 [employer]. Care instructions adapted under license by [facility] (which disclaims liability or warranty for this information). This care instruction is for use with your licensed healthcare professional. If you have questions about a medical condition or this instruction, always ask your healthcare professional. [place] [address] any warranty or liability for your use of this information. Content Version: 68.4.852865; Current as of: June 4, 2014

## 2019-10-14 NOTE — PROGRESS NOTES
Gene Mckeon is a 52 y.o. male (: 1970) presenting to address:    Chief Complaint   Patient presents with    Blood sugar problem     declined flu vaccine    Shoulder Pain     right shoulder pain/arthritis       Vitals:    10/14/19 0819   BP: 114/64   Pulse: (!) 50   Resp: 14   Temp: 97.8 °F (36.6 °C)   TempSrc: Oral   SpO2: 99%   Weight: 178 lb 3.2 oz (80.8 kg)   Height: 5' 9.17\" (1.757 m)   PainSc:   7   PainLoc: Shoulder       Hearing/Vision:   No exam data present    Learning Assessment:     Learning Assessment 2019   PRIMARY LEARNER Patient   HIGHEST LEVEL OF EDUCATION - PRIMARY LEARNER  SOME COLLEGE   BARRIERS PRIMARY LEARNER NONE   CO-LEARNER CAREGIVER No   PRIMARY LANGUAGE ENGLISH    NEED No   LEARNER PREFERENCE PRIMARY DEMONSTRATION   ANSWERED BY patient   RELATIONSHIP SELF     Depression Screening:     3 most recent PHQ Screens 10/14/2019   Little interest or pleasure in doing things Not at all   Feeling down, depressed, irritable, or hopeless Not at all   Total Score PHQ 2 0     Fall Risk Assessment:     Fall Risk Assessment, last 12 mths 2019   Able to walk? Yes   Fall in past 12 months? No     Abuse Screening:     Abuse Screening Questionnaire 2019   Do you ever feel afraid of your partner? N   Are you in a relationship with someone who physically or mentally threatens you? N   Is it safe for you to go home? Y     Coordination of Care Questionaire:   1. Have you been to the ER, urgent care clinic since your last visit? Hospitalized since your last visit? NO    2. Have you seen or consulted any other health care providers outside of the [address] [place] [address] since your last visit? Include any pap smears or colon screening. YES, ortho for shoulder injection    Advanced Directive:   1. Do you have an Advanced Directive? NO    2. Would you like information on Advanced Directives? NO    Patient DECLINED flu vaccine.

## 2019-10-30 ENCOUNTER — TELEPHONE (OUTPATIENT)
Dept: FAMILY MEDICINE CLINIC | Age: 49
End: 2019-10-30

## 2019-10-30 DIAGNOSIS — M24.811 INTERNAL DERANGEMENT OF RIGHT SHOULDER: Primary | ICD-10-CM

## 2019-10-30 NOTE — TELEPHONE ENCOUNTER
Please advise Mr. Mckeon Homes that the MRI of his right shoulder showed multiple abnormalities. I have generated a referral back to the orthopedic surgeon he saw before to see if there are any other interventions that would be of benefit to him.

## 2019-10-31 DIAGNOSIS — G89.29 CHRONIC RIGHT SHOULDER PAIN: ICD-10-CM

## 2019-10-31 DIAGNOSIS — M25.511 CHRONIC RIGHT SHOULDER PAIN: ICD-10-CM

## 2019-11-12 ENCOUNTER — OFFICE VISIT (OUTPATIENT)
Dept: ORTHOPEDIC SURGERY | Facility: CLINIC | Age: 49
End: 2019-11-12

## 2019-11-12 VITALS
RESPIRATION RATE: 18 BRPM | TEMPERATURE: 96.8 F | SYSTOLIC BLOOD PRESSURE: 141 MMHG | OXYGEN SATURATION: 96 % | DIASTOLIC BLOOD PRESSURE: 97 MMHG | BODY MASS INDEX: 26.54 KG/M2 | WEIGHT: 185.4 LBS | HEIGHT: 70 IN | HEART RATE: 84 BPM

## 2019-11-12 DIAGNOSIS — M19.011 PRIMARY OSTEOARTHRITIS OF RIGHT SHOULDER: Primary | ICD-10-CM

## 2019-11-12 DIAGNOSIS — M77.8 TENDINITIS OF RIGHT TRICEPS: ICD-10-CM

## 2019-11-12 DIAGNOSIS — M25.512 ACUTE PAIN OF LEFT SHOULDER: ICD-10-CM

## 2019-11-12 RX ORDER — DICLOFENAC SODIUM 50 MG/1
50 TABLET, DELAYED RELEASE ORAL 2 TIMES DAILY WITH MEALS
Qty: 120 TAB | Refills: 2 | Status: SHIPPED | OUTPATIENT
Start: 2019-11-12 | End: 2020-04-13

## 2019-11-12 NOTE — PROGRESS NOTES
Patient: Nemesio Mcguire                MRN: [id number]       SSN: [id number]  YOB: 1970        AGE: 52 y.o. SEX: male  Body mass index is 26.99 kg/m². PCP: Andrea Noel MD  11/12/19    Chief Complaint: Right shoulder pain    HISTORY OF PRESENT ILLNESS:  Magnolia Stevens returns to the office today for his right shoulder. He continues to have right shoulder pain. Unfortunately, the injection that he got at his last visit did not give him much in the way of pain relief. He has had an MRI of his shoulder. He is also reporting some right elbow pain, as well as left shoulder pain. He has not had any treatment for any of these other complaints. Past Medical History:   Diagnosis Date    Chronic pain     right shoulder pain    Diabetes ([address]     Hypercholesterolemia     Hypertension     Stroke [facility])        Family History   Problem Relation Age of Onset    Hypertension Father     Diabetes Father     Diabetes Paternal Grandmother        Current Outpatient Medications   Medication Sig Dispense Refill    diclofenac EC (VOLTAREN) 50 mg EC tablet Take 1 Tab by mouth two (2) times daily (with meals). 120 Tab 2    metFORMIN (GLUCOPHAGE) 500 mg tablet Take 1 Tab by mouth two (2) times daily (with meals). 180 Tab 3    simvastatin (ZOCOR) 40 mg tablet Take 1 Tab by mouth nightly. 90 Tab 3    lisinopril (PRINIVIL, ZESTRIL) 40 mg tablet Take 1 Tab by mouth daily. 90 Tab 3    glimepiride (AMARYL) 4 mg tablet Take 1 Tab by mouth every morning. 90 Tab 3    aspirin (ASPIRIN) 325 mg tablet Take 325 mg by mouth daily. No Known Allergies    History reviewed. No pertinent surgical history.     Social History     Socioeconomic History    Marital status: UNKNOWN     Spouse name: Not on file    Number of children: Not on file    Years of education: Not on file    Highest education level: Not on file   Occupational History    Not on file   Social Needs    Financial resource strain: Not on file    Food insecurity:     Worry: Not on file     Inability: Not on file    Transportation needs:     Medical: Not on file     Non-medical: Not on file   Tobacco Use    Smoking status: Former Smoker    Smokeless tobacco: Never Used   Substance and Sexual Activity    Alcohol use: Not Currently    Drug use: Never    Sexual activity: Not Currently     Partners: Female   Lifestyle    Physical activity:     Days per week: Not on file     Minutes per session: Not on file    Stress: Not on file   Relationships    Social connections:     Talks on phone: Not on file     Gets together: Not on file     Attends Congregational service: Not on file     Active member of club or organization: Not on file     Attends meetings of clubs or organizations: Not on file     Relationship status: Not on file    Intimate partner violence:     Fear of current or ex partner: Not on file     Emotionally abused: Not on file     Physically abused: Not on file     Forced sexual activity: Not on file   Other Topics Concern    Not on file   Social History Narrative    Not on file       REVIEW OF SYSTEMS:      No changes from previous review of systems unless noted. PHYSICAL EXAMINATION:  Visit Vitals  BP (!) 141/97 (BP 1 Location: Left arm, BP Patient Position: Sitting)   Pulse 84   Temp 96.8 °F (36 °C) (Oral)   Resp 18   Ht 5' 9.5\" (1.765 m)   Wt 185 lb 6.4 oz (84.1 kg)   SpO2 96% Comment: RA   BMI 26.99 kg/m²     Body mass index is 26.99 kg/m². GENERAL: Alert and oriented x3, in no acute distress. HEENT: Normocephalic, atraumatic. RESP: Non labored breathing. SKIN: No rashes or lesions noted. PHYSICAL EXAM:  Physical exam of the right shoulder with decreased range of motion and pain with capsular stretch. He has good rotator cuff strength, as well as biceps strength. He has mild pain with biceps stress testing. He is neurovascularly intact distally. Exam of the right elbow with full range of motion.   He is mildly tender to palpation over the lateral triceps insertion. He is neurovascularly intact. Left shoulder exam with full range of motion. No obvious pain or weakness with rotator cuff strength or impingement testing. No pain with biceps stress testing. IMAGING:  An MRI of the right shoulder was reviewed in the office today. This shows what his x-rays showed, which is arthritic changes in the right shoulder with osteophytes in the subcoracoid recess. He also has no full thickness rotator cuff tears noted. ASSESSMENT AND PLAN:   Sindi Anderson continues to have right shoulder pain, likely related to his underlying arthritis. He also has some triceps tendonitis and likely some left shoulder rotator cuff tendonitis due to overuse. I have recommended an oral antiinflammatory and follow up in six weeks. We did discuss the possibility of surgery, but I would hold off on that due to his age and activity level at this point in time. Follow up in six weeks.              Electronically signed by: Marcelle Fair MD

## 2019-11-12 NOTE — PROGRESS NOTES
1. Have you been to the ER, urgent care clinic since your last visit? Hospitalized since your last visit? No    2. Have you seen or consulted any other health care providers outside of the [address] [place] [address] since your last visit? Include any pap smears or colon screening.  No

## 2020-02-04 LAB
ANION GAP SERPL CALC-SCNC: 14 MMOL/L
AVG GLU, 10930: 134 MG/DL (ref 91–123)
BUN SERPL-MCNC: 19 MG/DL (ref 6–22)
CALCIUM SERPL-MCNC: 10.2 MG/DL (ref 8.4–10.5)
CHLORIDE SERPL-SCNC: 102 MMOL/L (ref 98–110)
CHOLEST SERPL-MCNC: 138 MG/DL (ref 110–200)
CO2 SERPL-SCNC: 24 MMOL/L (ref 20–32)
CREAT SERPL-MCNC: 1 MG/DL (ref 0.5–1.2)
GFRAA, 66117: >60
GFRNA, 66118: >60
GLUCOSE SERPL-MCNC: 121 MG/DL (ref 70–99)
HBA1C MFR BLD HPLC: 6.3 % (ref 4.8–5.6)
HDLC SERPL-MCNC: 3.7 MG/DL (ref 0–5)
HDLC SERPL-MCNC: 37 MG/DL
LDL/HDL RATIO,LDHD: 2.3
LDLC SERPL CALC-MCNC: 86 MG/DL (ref 50–99)
NON-HDL CHOLESTEROL, 011976: 101 MG/DL
POTASSIUM SERPL-SCNC: 5 MMOL/L (ref 3.5–5.5)
SODIUM SERPL-SCNC: 140 MMOL/L (ref 133–145)
TRIGL SERPL-MCNC: 75 MG/DL (ref 40–149)
VLDLC SERPL CALC-MCNC: 15 MG/DL (ref 8–30)

## 2020-02-09 NOTE — PROGRESS NOTES
HISTORY OF PRESENT ILLNESS  Jonathan Vega is a 48 y.o. male. Mr. Prabha Carbone returns for follow-up of type 2 diabetes, hypertension, chronic right shoulder pain with a prior history of stroke. He now reports having developed pain in the left shoulder. He reports that the diclofenac prescribed by the orthopedic surgery consultant has not been effective in managing his pain. He has a follow-up scheduled. Mr#: [id number]      Patient Active Problem List   Diagnosis Code    Type 2 diabetes mellitus without complication, without long-term current use of insulin ([facility]) E11.9    Essential hypertension I10    History of stroke Z86.73         Current Outpatient Medications:     diclofenac EC (VOLTAREN) 50 mg EC tablet, Take 1 Tab by mouth two (2) times daily (with meals). , Disp: 120 Tab, Rfl: 2    metFORMIN (GLUCOPHAGE) 500 mg tablet, Take 1 Tab by mouth two (2) times daily (with meals). , Disp: 180 Tab, Rfl: 3    simvastatin (ZOCOR) 40 mg tablet, Take 1 Tab by mouth nightly., Disp: 90 Tab, Rfl: 3    lisinopril (PRINIVIL, ZESTRIL) 40 mg tablet, Take 1 Tab by mouth daily. , Disp: 90 Tab, Rfl: 3    glimepiride (AMARYL) 4 mg tablet, Take 1 Tab by mouth every morning., Disp: 90 Tab, Rfl: 3    aspirin (ASPIRIN) 325 mg tablet, Take 325 mg by mouth daily. , Disp: , Rfl:      No Known Allergies      Review of Systems   Constitutional: Negative for fever and weight loss. Respiratory: Negative for shortness of breath. Cardiovascular: Negative for chest pain, palpitations and leg swelling. Gastrointestinal: Negative for abdominal pain, constipation, diarrhea, nausea and vomiting. Genitourinary: Negative for frequency. Musculoskeletal: Positive for joint pain ( Chronic right shoulder pain, orthopedic surgery consultant does not recommend surgical intervention-now with left shoulder pain as well, indicates that he is interested in obtaining a second orthopedic surgery opinion).    Neurological: Negative for dizziness, tingling, sensory change, speech change, focal weakness and headaches. Endo/Heme/Allergies: Negative for polydipsia. Visit Vitals  /88 (BP 1 Location: Left arm, BP Patient Position: Sitting)   Pulse 79   Temp 97.7 °F (36.5 °C) (Oral)   Resp 18   Ht 5' 9.5\" (1.765 m)   Wt 185 lb 6.4 oz (84.1 kg)   SpO2 100%   BMI 26.99 kg/m²       Physical Exam  Vitals signs and nursing note reviewed. Constitutional:       Appearance: He is well-developed. HENT:      Head: Normocephalic. Neck:      Musculoskeletal: Neck supple. Cardiovascular:      Rate and Rhythm: Normal rate and regular rhythm. Heart sounds: Normal heart sounds. Pulmonary:      Effort: Pulmonary effort is normal.      Breath sounds: Normal breath sounds. Musculoskeletal:         General: No deformity. Comments: Bilateral shoulder pain with  attempted extension   Skin:     General: Skin is warm and dry. Neurological:      Mental Status: He is alert and oriented to person, place, and time. Psychiatric:         Behavior: Behavior normal.            Diabetic foot exam performed by Maximiliano Gerard MD     Measurement  Response Nurse Comment Physician Comment   Monofilament  R - normal sensation with micro filament  L - normal sensation with micro filament     Pulse DP R - 2+ (normal)  L - 2+ (normal)     Pulse TP R - 2+ (normal)  L - 2+ (normal)     Structural deformity R - None  L - None     Skin Integrity / Deformity R - None  L - None                 Results for Phil Ross (MRN [id number]) as of 2/9/2020 15:39   Ref.  Range 7/11/2019 07:38 7/23/2019 14:53 10/14/2019 08:22 10/25/2019 11:29 2/3/2020 11:10   Sodium Latest Ref Range: 133 - 145 mmol/L     140   Potassium Latest Ref Range: 3.5 - 5.5 mmol/L     5.0   Chloride Latest Ref Range: 98 - 110 mmol/L     102   CO2 Latest Ref Range: 20 - 32 mmol/L     24   Anion gap Latest Units: mmol/L     14.0   Glucose Latest Ref Range: 70 - 99 mg/dL     121 (H)   BUN Latest Ref Range: 6 - 22 mg/dL     19   Creatinine Latest Ref Range: 0.5 - 1.2 mg/dL     1.0   Calcium Latest Ref Range: 8.4 - 10.5 mg/dL     10.2   Triglyceride Latest Ref Range: 40 - 149 mg/dL     75   Cholesterol, total Latest Ref Range: 110 - 200 mg/dL     138   HDL Cholesterol Latest Ref Range: >=40 mg/dL     37 (L)   CHOLESTEROL/HDL Latest Ref Range: 0.0 - 5.0      3.7   Non-HDL Cholesterol Latest Ref Range: <130 mg/dL     101   VLDL, calculated Latest Ref Range: 8 - 30 mg/dL     15   LDL, calculated Latest Ref Range: 50 - 99 mg/dL     86   LDL/HDL Ratio Unknown     2.3   Hemoglobin A1c, (calculated) Latest Ref Range: 4.8 - 5.6 %     6.3 (H)   Hemoglobin A1c (POC) Latest Units: %   5.7     Microalbumin urine (POC) Latest Units: MG/L 30       Microalbumin/creat ratio (POC) Latest Ref Range: <30 MG/G <30         ASSESSMENT and PLAN    ICD-10-CM ICD-9-CM    1. Type 2 diabetes mellitus without complication, without long-term current use of insulin (HCC) E11.9 250.00 atorvastatin (LIPITOR) 40 mg tablet       DIABETES FOOT EXAM      CBC WITH AUTOMATED DIFF      HEMOGLOBIN A1C WITH EAG      LIPID PANEL      MICROALBUMIN, UR, RAND W/ MICROALB/CREAT RATIO   2. Essential hypertension I10 401.9 CBC WITH AUTOMATED DIFF      LIPID PANEL      METABOLIC PANEL, COMPREHENSIVE      URINALYSIS W/ RFLX MICROSCOPIC   3. History of stroke Z86.73 V12.54 CBC WITH AUTOMATED DIFF   4. Bilateral shoulder pain, unspecified chronicity M25.511 719.41     M25.512     5. Colon cancer screening Z12.11 V76.51 REFERRAL TO GASTROENTEROLOGY   6. Prostate cancer screening Z12.5 V76.44 PSA SCREENING (SCREENING)   Type 2 diabetes in good control, LDL cholesterol above goal.  Continue current medications except for simvastatin.   Stop simvastatin and begin atorvastatin 40 mg daily  Call insurance carrier and ask for the names of on plan orthopedic surgeon specializing in shoulder surgery in this area and then call back with names for referral  Continue to avoid dietary salt, starch and sugar and follow a program of regular aerobic exercise  Referral for screening colonoscopy  Return for annual physical exam in July, follow-up for fasting lab prior to the appointment      PLEASE NOTE:   This document has been produced using voice recognition software. Unrecognized errors in transcription may be present.

## 2020-02-10 ENCOUNTER — OFFICE VISIT (OUTPATIENT)
Dept: FAMILY MEDICINE CLINIC | Age: 50
End: 2020-02-10

## 2020-02-10 VITALS
HEART RATE: 79 BPM | RESPIRATION RATE: 18 BRPM | DIASTOLIC BLOOD PRESSURE: 88 MMHG | HEIGHT: 70 IN | BODY MASS INDEX: 26.54 KG/M2 | WEIGHT: 185.4 LBS | SYSTOLIC BLOOD PRESSURE: 131 MMHG | OXYGEN SATURATION: 100 % | TEMPERATURE: 97.7 F

## 2020-02-10 DIAGNOSIS — Z12.11 COLON CANCER SCREENING: ICD-10-CM

## 2020-02-10 DIAGNOSIS — I10 ESSENTIAL HYPERTENSION: ICD-10-CM

## 2020-02-10 DIAGNOSIS — Z86.73 HISTORY OF STROKE: ICD-10-CM

## 2020-02-10 DIAGNOSIS — E11.9 TYPE 2 DIABETES MELLITUS WITHOUT COMPLICATION, WITHOUT LONG-TERM CURRENT USE OF INSULIN (HCC): Primary | ICD-10-CM

## 2020-02-10 DIAGNOSIS — M25.511 BILATERAL SHOULDER PAIN, UNSPECIFIED CHRONICITY: ICD-10-CM

## 2020-02-10 DIAGNOSIS — M25.512 BILATERAL SHOULDER PAIN, UNSPECIFIED CHRONICITY: ICD-10-CM

## 2020-02-10 DIAGNOSIS — Z12.5 PROSTATE CANCER SCREENING: ICD-10-CM

## 2020-02-10 RX ORDER — ATORVASTATIN CALCIUM 40 MG/1
40 TABLET, FILM COATED ORAL DAILY
Qty: 90 TAB | Refills: 4 | Status: SHIPPED | OUTPATIENT
Start: 2020-02-10 | End: 2021-02-10

## 2020-02-10 NOTE — PROGRESS NOTES
Aman Davey is a 48 y.o. male (: 1970) presenting to address:    Chief Complaint   Patient presents with    Diabetes     Flu shot declined    Hypertension       Vitals:    02/10/20 0820   BP: 131/88   Pulse: 79   Resp: 18   Temp: 97.7 °F (36.5 °C)   TempSrc: Oral   SpO2: 100%   Weight: 185 lb 6.4 oz (84.1 kg)   Height: 5' 9.5\" (1.765 m)   PainSc:   8   PainLoc: Shoulder       Hearing/Vision:   No exam data present    Learning Assessment:     Learning Assessment 2019   PRIMARY LEARNER Patient   HIGHEST LEVEL OF EDUCATION - PRIMARY LEARNER  SOME COLLEGE   BARRIERS PRIMARY LEARNER NONE   CO-LEARNER CAREGIVER No   PRIMARY LANGUAGE ENGLISH    NEED No   LEARNER PREFERENCE PRIMARY DEMONSTRATION   ANSWERED BY patient   RELATIONSHIP SELF     Depression Screening:     3 most recent PHQ Screens 2/10/2020   Little interest or pleasure in doing things Not at all   Feeling down, depressed, irritable, or hopeless Not at all   Total Score PHQ 2 0     Fall Risk Assessment:     Fall Risk Assessment, last 12 mths 2019   Able to walk? Yes   Fall in past 12 months? No     Abuse Screening:     Abuse Screening Questionnaire 2019   Do you ever feel afraid of your partner? N   Are you in a relationship with someone who physically or mentally threatens you? N   Is it safe for you to go home? Y     Coordination of Care Questionaire:   1. Have you been to the ER, urgent care clinic since your last visit? Hospitalized since your last visit? NO    2. Have you seen or consulted any other health care providers outside of the [address] [place] [address] since your last visit? Include any pap smears or colon screening. YES Orthopedics    Advanced Directive:   1. Do you have an Advanced Directive? NO    2. Would you like information on Advanced Directives?  NO

## 2020-02-10 NOTE — PATIENT INSTRUCTIONS
Continue current medications except for simvastatin. Stop simvastatin and begin atorvastatin 40 mg daily Call insurance carrier and ask for the names of on plan orthopedic surgeon specializing in shoulder surgery in this area and then call back with the name for referral 
Continue to avoid dietary salt, starch and sugar and follow a program of regular aerobic exercise Referral for screening colonoscopy Return for annual physical exam in July, follow-up for fasting lab prior to the appointment

## 2020-03-06 ENCOUNTER — TELEPHONE (OUTPATIENT)
Dept: FAMILY MEDICINE CLINIC | Age: 50
End: 2020-03-06

## 2020-03-06 NOTE — TELEPHONE ENCOUNTER
Patient called the office requesting a referral for Orthopedic surgery for shoulder surgery.       [facility] Orthopedic Specialist  Ph: [phone]  [address]  [address]    Sergio Alex  Ph: [phone]  [facility] [address] [facility] [address]

## 2020-03-09 NOTE — TELEPHONE ENCOUNTER
Attempted to contact pt, voicemail is full; pt called back and was informed the referral was generated and faxed.

## 2020-04-10 DIAGNOSIS — M19.011 PRIMARY OSTEOARTHRITIS OF RIGHT SHOULDER: ICD-10-CM

## 2020-04-10 DIAGNOSIS — M77.8 TENDINITIS OF RIGHT TRICEPS: ICD-10-CM

## 2020-04-13 RX ORDER — DICLOFENAC SODIUM 50 MG/1
TABLET, DELAYED RELEASE ORAL
Qty: 120 TAB | Refills: 2 | Status: SHIPPED | OUTPATIENT
Start: 2020-04-13 | End: 2020-05-01

## 2020-05-01 DIAGNOSIS — M19.011 PRIMARY OSTEOARTHRITIS OF RIGHT SHOULDER: ICD-10-CM

## 2020-05-01 DIAGNOSIS — M77.8 TENDINITIS OF RIGHT TRICEPS: ICD-10-CM

## 2020-05-01 RX ORDER — DICLOFENAC SODIUM 50 MG/1
TABLET, DELAYED RELEASE ORAL
Qty: 120 TAB | Refills: 2 | Status: SHIPPED | OUTPATIENT
Start: 2020-05-01 | End: 2021-02-22 | Stop reason: SDUPTHER

## 2020-07-01 DIAGNOSIS — Z12.5 PROSTATE CANCER SCREENING: ICD-10-CM

## 2020-07-01 DIAGNOSIS — E11.9 TYPE 2 DIABETES MELLITUS WITHOUT COMPLICATION, WITHOUT LONG-TERM CURRENT USE OF INSULIN (HCC): ICD-10-CM

## 2020-07-01 DIAGNOSIS — I10 ESSENTIAL HYPERTENSION: ICD-10-CM

## 2020-07-01 DIAGNOSIS — Z86.73 HISTORY OF STROKE: ICD-10-CM

## 2020-07-01 RX ORDER — GLIMEPIRIDE 4 MG/1
TABLET ORAL
Qty: 90 TAB | Refills: 3 | Status: SHIPPED | OUTPATIENT
Start: 2020-07-01 | End: 2021-02-10 | Stop reason: SDUPTHER

## 2020-07-02 DIAGNOSIS — E11.9 TYPE 2 DIABETES MELLITUS WITHOUT COMPLICATION, WITHOUT LONG-TERM CURRENT USE OF INSULIN (HCC): ICD-10-CM

## 2020-07-02 DIAGNOSIS — I10 ESSENTIAL HYPERTENSION: ICD-10-CM

## 2020-07-02 DIAGNOSIS — Z86.73 HISTORY OF STROKE: ICD-10-CM

## 2020-07-02 RX ORDER — LISINOPRIL 40 MG/1
TABLET ORAL
Qty: 90 TAB | Refills: 3 | Status: SHIPPED | OUTPATIENT
Start: 2020-07-02 | End: 2021-02-10 | Stop reason: SDUPTHER

## 2020-07-02 RX ORDER — SIMVASTATIN 40 MG/1
TABLET, FILM COATED ORAL
Qty: 90 TAB | Refills: 3 | OUTPATIENT
Start: 2020-07-02

## 2020-07-02 RX ORDER — METFORMIN HYDROCHLORIDE 500 MG/1
TABLET ORAL
Qty: 180 TAB | Refills: 3 | Status: SHIPPED | OUTPATIENT
Start: 2020-07-02 | End: 2021-02-10 | Stop reason: SDUPTHER

## 2020-07-12 NOTE — PROGRESS NOTES
HISTORY OF PRESENT ILLNESS  Renard Granados is a 48 y.o. male. He presents for annual physical exam and preventative care as well as follow-up of type 2 diabetes, essential hypertension, history of stroke, chronic shoulder pain    Mr#: [id number]      Past Medical History:   Diagnosis Date    Chronic pain     right shoulder pain    Diabetes ([address]     Hypercholesterolemia     Hypertension     Stroke ([address]        No past surgical history on file. Family History   Problem Relation Age of Onset    Hypertension Father     Diabetes Father     Diabetes Paternal Grandmother        No Known Allergies    Social History     Tobacco Use   Smoking Status Former Smoker    Start date:     Last attempt to quit: 3/17/2019    Years since quittin.3   Smokeless Tobacco Never Used       Social History     Substance and Sexual Activity   Alcohol Use Not Currently       Health Maintenance Review:  Immunizations,  Immunization History   Administered Date(s) Administered    Pneumococcal Polysaccharide (PPSV-23) 2019    Tdap 2019     Colonoscopy: Previously referred, initial appointment completed, scheduled 2020         Patient Active Problem List   Diagnosis Code    Type 2 diabetes mellitus without complication, without long-term current use of insulin ([address] E11.9    Essential hypertension I10    History of stroke Z86.73         Current Outpatient Medications:     metFORMIN (GLUCOPHAGE) 500 mg tablet, take 1 tablet by mouth twice a day with food, Disp: 180 Tab, Rfl: 3    lisinopriL (PRINIVIL, ZESTRIL) 40 mg tablet, take 1 tablet by mouth once daily, Disp: 90 Tab, Rfl: 3    glimepiride (AMARYL) 4 mg tablet, take 1 tablet by mouth every morning, Disp: 90 Tab, Rfl: 3    diclofenac EC (VOLTAREN) 50 mg EC tablet, take 1 tablet by mouth twice a day with food, Disp: 120 Tab, Rfl: 2    atorvastatin (LIPITOR) 40 mg tablet, Take 1 Tab by mouth daily. , Disp: 90 Tab, Rfl: 4    aspirin (ASPIRIN) 325 mg tablet, Take 325 mg by mouth daily. , Disp: , Rfl:        Review of Systems   Constitutional: Negative for chills, fever and weight loss. HENT: Negative for congestion, ear pain, hearing loss and sore throat. Eyes: Negative for blurred vision and double vision. Eye exam last August   Respiratory: Negative for cough, shortness of breath and wheezing. Cardiovascular: Negative for chest pain, palpitations and leg swelling. Gastrointestinal: Negative for abdominal pain, blood in stool, constipation, diarrhea, heartburn, melena, nausea and vomiting. Genitourinary: Negative for dysuria, frequency and urgency. Musculoskeletal: Positive for joint pain (left shoulder pain much improved). Negative for myalgias. Skin: Negative for itching and rash. Neurological: Negative for dizziness, tingling, sensory change, focal weakness and headaches. Endo/Heme/Allergies: Negative for environmental allergies. Psychiatric/Behavioral: Negative for depression. The patient is not nervous/anxious and does not have insomnia. Visit Vitals  /74 (BP 1 Location: Left arm, BP Patient Position: Sitting)   Pulse 85   Temp 97.9 °F (36.6 °C) (Oral)   Resp 16   Ht 5' 9.5\" (1.765 m)   Wt 188 lb 9.6 oz (85.5 kg)   SpO2 97%   BMI 27.45 kg/m²     Results for Bruno Abdi (MRN [id number]) as of 7/13/2020 08:32   Ref. Range 10/14/2019 08:22 10/25/2019 11:29 2/3/2020 11:10   Hemoglobin A1c, (calculated) Latest Ref Range: 4.8 - 5.6 %   6.3 (H)   Hemoglobin A1c (POC) Latest Units: % 5.7       Physical Exam  Constitutional:       General: He is not in acute distress. Appearance: Normal appearance. He is not ill-appearing. HENT:      Head: Normocephalic. Right Ear: Tympanic membrane, ear canal and external ear normal.      Left Ear: Tympanic membrane, ear canal and external ear normal.      Mouth/Throat:      Mouth: Mucous membranes are moist.      Pharynx: Oropharynx is clear.    Eyes:      Extraocular Movements: Extraocular movements intact. Conjunctiva/sclera: Conjunctivae normal.      Pupils: Pupils are equal, round, and reactive to light. Neck:      Musculoskeletal: Neck supple. Vascular: No carotid bruit. Cardiovascular:      Rate and Rhythm: Normal rate and regular rhythm. Heart sounds: Normal heart sounds. Pulmonary:      Effort: Pulmonary effort is normal.      Breath sounds: Normal breath sounds. Abdominal:      General: Bowel sounds are normal.      Palpations: Abdomen is soft. Tenderness: There is no abdominal tenderness. Musculoskeletal:         General: No deformity. Right lower leg: No edema. Left lower leg: No edema. Comments: Decreased abduction, extension left shoulder-has orthopedic surgery follow-up today   Skin:     General: Skin is warm and dry. Neurological:      General: No focal deficit present. Mental Status: He is alert and oriented to person, place, and time. Psychiatric:         Mood and Affect: Mood normal.         Behavior: Behavior normal.         Thought Content: Thought content normal.         ASSESSMENT and PLAN    ICD-10-CM ICD-9-CM    1. Routine general medical examination at a health care facility  Z00.00 V70.0 CBC WITH AUTOMATED DIFF      LIPID PANEL      METABOLIC PANEL, COMPREHENSIVE      URINALYSIS W/ RFLX MICROSCOPIC      MICROALBUMIN, UR, RAND W/ MICROALB/CREAT RATIO      PSA SCREENING (SCREENING)      PSA SCREENING (SCREENING)      MICROALBUMIN, UR, RAND W/ MICROALB/CREAT RATIO      URINALYSIS W/ RFLX MICROSCOPIC      METABOLIC PANEL, COMPREHENSIVE      LIPID PANEL      CBC WITH AUTOMATED DIFF   2.  Type 2 diabetes mellitus without complication, without long-term current use of insulin ([facility])  E11.9 250.00 AMB POC HEMOGLOBIN A1C      CBC WITH AUTOMATED DIFF      LIPID PANEL      MICROALBUMIN, UR, RAND W/ MICROALB/CREAT RATIO      MICROALBUMIN, UR, RAND W/ MICROALB/CREAT RATIO      LIPID PANEL      CBC WITH AUTOMATED DIFF      HEMOGLOBIN A1C WITH EAG      LIPID PANEL      METABOLIC PANEL, BASIC   3. Essential hypertension  I10 401.9 CBC WITH AUTOMATED DIFF      METABOLIC PANEL, COMPREHENSIVE      URINALYSIS W/ RFLX MICROSCOPIC      URINALYSIS W/ RFLX MICROSCOPIC      METABOLIC PANEL, COMPREHENSIVE      CBC WITH AUTOMATED DIFF   4. History of stroke  Z86.73 V12.54 CBC WITH AUTOMATED DIFF      CBC WITH AUTOMATED DIFF   5. Chronic right shoulder pain  M25.511 719.41     G89.29 338.29    6. Prostate cancer screening  Z12.5 V76.44 PSA SCREENING (SCREENING)      PSA SCREENING (SCREENING)   7. Encounter for diabetes type 2 eye exam ([facility]  Z01.00 V72.0     E11.9 250.00    Hypertension well controlled  Diabetes well controlled  Lab today, further disposition pending lab results  Continue to avoid dietary starch and sugar and follow a program of regular aerobic exercise  Follow through with colonoscopy  Schedule a diabetic eye exam, have results sent here.   Office follow-up in 6 months, sooner with any problems, lab studies several days prior to the appointment

## 2020-07-13 ENCOUNTER — OFFICE VISIT (OUTPATIENT)
Dept: FAMILY MEDICINE CLINIC | Age: 50
End: 2020-07-13

## 2020-07-13 VITALS
SYSTOLIC BLOOD PRESSURE: 110 MMHG | HEIGHT: 70 IN | BODY MASS INDEX: 27 KG/M2 | RESPIRATION RATE: 16 BRPM | DIASTOLIC BLOOD PRESSURE: 74 MMHG | WEIGHT: 188.6 LBS | HEART RATE: 85 BPM | TEMPERATURE: 97.9 F | OXYGEN SATURATION: 97 %

## 2020-07-13 DIAGNOSIS — G89.29 CHRONIC RIGHT SHOULDER PAIN: ICD-10-CM

## 2020-07-13 DIAGNOSIS — Z12.5 PROSTATE CANCER SCREENING: ICD-10-CM

## 2020-07-13 DIAGNOSIS — Z01.00 ENCOUNTER FOR DIABETES TYPE 2 EYE EXAM (HCC): ICD-10-CM

## 2020-07-13 DIAGNOSIS — M25.511 CHRONIC RIGHT SHOULDER PAIN: ICD-10-CM

## 2020-07-13 DIAGNOSIS — E11.9 TYPE 2 DIABETES MELLITUS WITHOUT COMPLICATION, WITHOUT LONG-TERM CURRENT USE OF INSULIN (HCC): ICD-10-CM

## 2020-07-13 DIAGNOSIS — I10 ESSENTIAL HYPERTENSION: ICD-10-CM

## 2020-07-13 DIAGNOSIS — Z00.00 ROUTINE GENERAL MEDICAL EXAMINATION AT A HEALTH CARE FACILITY: Primary | ICD-10-CM

## 2020-07-13 DIAGNOSIS — Z86.73 HISTORY OF STROKE: ICD-10-CM

## 2020-07-13 DIAGNOSIS — E11.9 ENCOUNTER FOR DIABETES TYPE 2 EYE EXAM (HCC): ICD-10-CM

## 2020-07-13 NOTE — PROGRESS NOTES
Helen Roman is a 48 y.o. male (: 1970) presenting to address:    Chief Complaint   Patient presents with    Physical       Vitals:    20 0801   BP: 110/74   Pulse: 85   Resp: 16   Temp: 97.9 °F (36.6 °C)   TempSrc: Oral   SpO2: 97%   Weight: 188 lb 9.6 oz (85.5 kg)   Height: 5' 9.5\" (1.765 m)   PainSc:   0 - No pain       Hearing/Vision:   No exam data present    Learning Assessment:     Learning Assessment 2019   PRIMARY LEARNER Patient   HIGHEST LEVEL OF EDUCATION - PRIMARY LEARNER  SOME COLLEGE   BARRIERS PRIMARY LEARNER NONE   CO-LEARNER CAREGIVER No   PRIMARY LANGUAGE ENGLISH    NEED No   LEARNER PREFERENCE PRIMARY DEMONSTRATION   ANSWERED BY patient   RELATIONSHIP SELF     Depression Screening:     3 most recent PHQ Screens 2020   Little interest or pleasure in doing things Not at all   Feeling down, depressed, irritable, or hopeless Not at all   Total Score PHQ 2 0     Fall Risk Assessment:     Fall Risk Assessment, last 12 mths 2019   Able to walk? Yes   Fall in past 12 months? No     Abuse Screening:     Abuse Screening Questionnaire 2019   Do you ever feel afraid of your partner? N   Are you in a relationship with someone who physically or mentally threatens you? N   Is it safe for you to go home? Y     Coordination of Care Questionaire:   1. Have you been to the ER, urgent care clinic since your last visit? Hospitalized since your last visit? NO    2. Have you seen or consulted any other health care providers outside of the [address] [place] [address] since your last visit? Include any pap smears or colon screening. YES, ortho for bilat shoulder pain, received inhjections    Advanced Directive:   1. Do you have an Advanced Directive? NO    2. Would you like information on Advanced Directives?  NO

## 2020-07-13 NOTE — PATIENT INSTRUCTIONS
Hypertension well controlled Diabetes now well controlled Lab today, further disposition pending lab results Continue to avoid dietary starch and sugar and follow a program of regular aerobic exercise Follow through with colonoscopy Schedule a diabetic eye exam, have results sent here. Office follow-up in 6 months, sooner with any problems, lab studies several days prior to the appointment

## 2021-01-01 DIAGNOSIS — E11.9 TYPE 2 DIABETES MELLITUS WITHOUT COMPLICATION, WITHOUT LONG-TERM CURRENT USE OF INSULIN (HCC): ICD-10-CM

## 2021-02-21 NOTE — PROGRESS NOTES
HISTORY OF PRESENT ILLNESS  Marlon Harrison is a 46 y.o. male. He presents for health assessment, preventative care and follow-up with a history of type 2 diabetes, hypertension as well as stroke. Mr#: [id number]      Past Medical History:   Diagnosis Date    Chronic pain     right shoulder pain    Diabetes ([address]     Hypercholesterolemia     Hypertension     Stroke [facility])        History reviewed. No pertinent surgical history. Family History   Problem Relation Age of Onset    Hypertension Father     Diabetes Father     Diabetes Paternal Grandmother        No Known Allergies    Social History     Tobacco Use   Smoking Status Former Smoker    Start date:     Quit date: 3/17/2019    Years since quittin.9   Smokeless Tobacco Never Used       Social History     Substance and Sexual Activity   Alcohol Use Not Currently           Patient Active Problem List   Diagnosis Code    Type 2 diabetes mellitus without complication, without long-term current use of insulin ([facility]) E11.9    Essential hypertension I10    History of stroke Z86.73         Current Outpatient Medications:     atorvastatin (LIPITOR) 40 mg tablet, take 1 tablet by mouth daily, Disp: 90 Tab, Rfl: 0    metFORMIN (GLUCOPHAGE) 500 mg tablet, take 1 tablet by mouth twice a day with food, Disp: 180 Tab, Rfl: 0    glimepiride (AMARYL) 4 mg tablet, take 1 tablet by mouth every morning, Disp: 90 Tab, Rfl: 0    lisinopriL (PRINIVIL, ZESTRIL) 40 mg tablet, take 1 tablet by mouth once daily, Disp: 90 Tab, Rfl: 0    diclofenac EC (VOLTAREN) 50 mg EC tablet, take 1 tablet by mouth twice a day with food, Disp: 120 Tab, Rfl: 2    aspirin (ASPIRIN) 325 mg tablet, Take 325 mg by mouth daily. , Disp: , Rfl:        Review of Systems   Constitutional: Positive for weight loss (intentional). Negative for fever and malaise/fatigue. HENT: Negative for congestion, ear pain, hearing loss and sore throat. Eyes: Negative for blurred vision. Eye exam 8/2019   Respiratory: Negative for cough, shortness of breath and wheezing. Cardiovascular: Negative for chest pain, palpitations, orthopnea, claudication ( He denies symptoms of claudication despite marked asymmetry in pedal pulses) and leg swelling. Gastrointestinal: Negative for abdominal pain, blood in stool, constipation, diarrhea, heartburn, melena, nausea and vomiting. Genitourinary: Negative for dysuria, frequency, hematuria and urgency. Musculoskeletal: Positive for joint pain (shoulders much improved with PT/dry needling). Negative for back pain and myalgias. Skin: Negative for itching and rash. Neurological: Negative for dizziness, tingling, sensory change, focal weakness and headaches. Endo/Heme/Allergies: Negative for environmental allergies. Psychiatric/Behavioral: Negative for depression and suicidal ideas. The patient is not nervous/anxious. Visit Vitals  /70 (BP 1 Location: Left upper arm, BP Patient Position: Sitting, BP Cuff Size: Adult)   Pulse 85   Temp 97.4 °F (36.3 °C) (Temporal)   Resp 14   Ht 5' 9.5\" (1.765 m)   Wt 175 lb 9.6 oz (79.7 kg)   SpO2 99%   BMI 25.56 kg/m²       Physical Exam  Vitals signs and nursing note reviewed. Constitutional:       General: He is not in acute distress. Appearance: Normal appearance. He is not ill-appearing. HENT:      Head: Normocephalic. Right Ear: Tympanic membrane, ear canal and external ear normal.      Left Ear: Tympanic membrane, ear canal and external ear normal.   Eyes:      Extraocular Movements: Extraocular movements intact. Conjunctiva/sclera: Conjunctivae normal.      Pupils: Pupils are equal, round, and reactive to light. Neck:      Musculoskeletal: Neck supple. Vascular: No carotid bruit. Cardiovascular:      Rate and Rhythm: Normal rate and regular rhythm. Heart sounds: Normal heart sounds.    Pulmonary:      Effort: Pulmonary effort is normal.      Breath sounds: Normal breath sounds. Abdominal:      Palpations: Abdomen is soft. Tenderness: There is no abdominal tenderness. Musculoskeletal:         General: No deformity. Right lower leg: No edema. Left lower leg: No edema. Skin:     General: Skin is warm and dry. Neurological:      General: No focal deficit present. Mental Status: He is alert and oriented to person, place, and time. Psychiatric:         Mood and Affect: Mood normal.         Behavior: Behavior normal.            Diabetic foot exam performed by Salena Palma MD     Measurement  Response Nurse Comment Physician Comment   Monofilament  R - normal sensation with micro filament  L - normal sensation with micro filament     Pulse DP R - trace  L - 2+ (normal)     Pulse TP R -trace  L - 2+ (normal)     Structural deformity R - None  L - None     Skin Integrity / Deformity R - None  L - None                   ASSESSMENT and PLAN    ICD-10-CM ICD-9-CM    1. Routine general medical examination at a health care facility  Z00.00 V70.0    2. Type 2 diabetes mellitus without complication, without long-term current use of insulin (HCC)  E11.9 250.00 MICROALBUMIN, UR, RAND W/ MICROALB/CREAT RATIO      HEMOGLOBIN A1C WITH EAG      atorvastatin (LIPITOR) 40 mg tablet      metFORMIN (GLUCOPHAGE) 500 mg tablet      glimepiride (AMARYL) 4 mg tablet   3. Essential hypertension  I10 401.9 URINALYSIS W/ RFLX MICROSCOPIC      METABOLIC PANEL, COMPREHENSIVE      LIPID PANEL      lisinopriL (PRINIVIL, ZESTRIL) 40 mg tablet   4. History of stroke  Z86.73 V12.54 CBC WITH AUTOMATED DIFF   5. Prostate cancer screening  Z12.5 V76.44 MICROALBUMIN, UR, RAND W/ MICROALB/CREAT RATIO      PSA SCREENING (SCREENING)   6. Need for hepatitis C screening test  Z11.59 V73.89 HEPATITIS C AB   7. Colon cancer screening  Z12.11 V76.51 REFERRAL TO GASTROENTEROLOGY   8. Primary osteoarthritis of right shoulder  M19.011 715.11 diclofenac EC (VOLTAREN) 50 mg EC tablet   9.  Tendinitis of right triceps  M77.8 726.39 diclofenac EC (VOLTAREN) 50 mg EC tablet   Health maintenance:    COVID-19 immunization-declines  Influenza immunization-declines  Shingrix immunization to reduce the risk of shingles available at the pharmacy-declines      Colorectal cancer screening-referral generated  Diabetic eye exam-. Schedule a diabetic eye exam, have results sent here. Lab today further disposition pending lab results if indicated  Continue current medications pending lab results  Avoid dietary salt, starch and sugar and follow program of regular aerobic exercise  Return for follow-up in 6 months, sooner with any problems      Tiesha Courtney MD  Addendum 2/24/2021: Lab results with the patient's first ever PSA elevated at 7.040, will refer for urology evaluation and management    PLEASE NOTE:   This document has been produced using voice recognition software. Unrecognized errors in transcription may be present.

## 2021-02-22 ENCOUNTER — APPOINTMENT (OUTPATIENT)
Dept: FAMILY MEDICINE CLINIC | Age: 51
End: 2021-02-22

## 2021-02-22 ENCOUNTER — OFFICE VISIT (OUTPATIENT)
Dept: FAMILY MEDICINE CLINIC | Age: 51
End: 2021-02-22
Payer: MEDICAID

## 2021-02-22 VITALS
HEART RATE: 85 BPM | TEMPERATURE: 97.4 F | DIASTOLIC BLOOD PRESSURE: 70 MMHG | OXYGEN SATURATION: 99 % | BODY MASS INDEX: 25.14 KG/M2 | HEIGHT: 70 IN | SYSTOLIC BLOOD PRESSURE: 110 MMHG | RESPIRATION RATE: 14 BRPM | WEIGHT: 175.6 LBS

## 2021-02-22 DIAGNOSIS — Z12.5 PROSTATE CANCER SCREENING: ICD-10-CM

## 2021-02-22 DIAGNOSIS — Z11.59 NEED FOR HEPATITIS C SCREENING TEST: ICD-10-CM

## 2021-02-22 DIAGNOSIS — E11.9 TYPE 2 DIABETES MELLITUS WITHOUT COMPLICATION, WITHOUT LONG-TERM CURRENT USE OF INSULIN (HCC): ICD-10-CM

## 2021-02-22 DIAGNOSIS — I10 ESSENTIAL HYPERTENSION: ICD-10-CM

## 2021-02-22 DIAGNOSIS — Z86.73 HISTORY OF STROKE: ICD-10-CM

## 2021-02-22 DIAGNOSIS — M77.8 TENDINITIS OF RIGHT TRICEPS: ICD-10-CM

## 2021-02-22 DIAGNOSIS — Z00.00 ROUTINE GENERAL MEDICAL EXAMINATION AT A HEALTH CARE FACILITY: Primary | ICD-10-CM

## 2021-02-22 DIAGNOSIS — R97.20 ELEVATED PSA: ICD-10-CM

## 2021-02-22 DIAGNOSIS — Z12.11 COLON CANCER SCREENING: ICD-10-CM

## 2021-02-22 DIAGNOSIS — M19.011 PRIMARY OSTEOARTHRITIS OF RIGHT SHOULDER: ICD-10-CM

## 2021-02-22 PROCEDURE — 99396 PREV VISIT EST AGE 40-64: CPT | Performed by: FAMILY MEDICINE

## 2021-02-22 RX ORDER — GLIMEPIRIDE 4 MG/1
TABLET ORAL
Qty: 90 TAB | Refills: 3 | Status: SHIPPED | OUTPATIENT
Start: 2021-02-22 | End: 2022-03-26

## 2021-02-22 RX ORDER — LISINOPRIL 40 MG/1
TABLET ORAL
Qty: 90 TAB | Refills: 3 | Status: SHIPPED | OUTPATIENT
Start: 2021-02-22 | End: 2022-03-26

## 2021-02-22 RX ORDER — DICLOFENAC SODIUM 50 MG/1
TABLET, DELAYED RELEASE ORAL
Qty: 120 TAB | Refills: 2 | Status: SHIPPED | OUTPATIENT
Start: 2021-02-22 | End: 2022-02-11

## 2021-02-22 RX ORDER — ATORVASTATIN CALCIUM 40 MG/1
TABLET, FILM COATED ORAL
Qty: 90 TAB | Refills: 3 | Status: SHIPPED | OUTPATIENT
Start: 2021-02-22 | End: 2022-03-20

## 2021-02-22 RX ORDER — METFORMIN HYDROCHLORIDE 500 MG/1
TABLET ORAL
Qty: 180 TAB | Refills: 3 | Status: SHIPPED | OUTPATIENT
Start: 2021-02-22 | End: 2022-03-26

## 2021-02-22 NOTE — PROGRESS NOTES
Valentin Rooney is a 46 y.o. male (: 1970) presenting to address:    Chief Complaint   Patient presents with    Follow-up       Vitals:    21 0928   BP: 110/70   Pulse: 85   Resp: 14   Temp: 97.4 °F (36.3 °C)   TempSrc: Temporal   SpO2: 99%   Weight: 175 lb 9.6 oz (79.7 kg)   Height: 5' 9.5\" (1.765 m)   PainSc:   0 - No pain       Hearing/Vision:   No exam data present    Learning Assessment:     Learning Assessment 2019   PRIMARY LEARNER Patient   HIGHEST LEVEL OF EDUCATION - PRIMARY LEARNER  SOME COLLEGE   BARRIERS PRIMARY LEARNER NONE   CO-LEARNER CAREGIVER No   PRIMARY LANGUAGE ENGLISH    NEED No   LEARNER PREFERENCE PRIMARY DEMONSTRATION   ANSWERED BY patient   RELATIONSHIP SELF     Depression Screening:     3 most recent PHQ Screens 2021   [facility] Not Done Patient Decline   Little interest or pleasure in doing things Not at all   Feeling down, depressed, irritable, or hopeless Not at all   Total Score PHQ 2 0     Fall Risk Assessment:     Fall Risk Assessment, last 12 mths 2021   Able to walk? Yes   Fall in past 12 months? 0   Do you feel unsteady? 0   Are you worried about falling 0     Abuse Screening:     Abuse Screening Questionnaire 2019   Do you ever feel afraid of your partner? N   Are you in a relationship with someone who physically or mentally threatens you? N   Is it safe for you to go home? Y     Coordination of Care Questionaire:   1. Have you been to the ER, urgent care clinic since your last visit? Hospitalized since your last visit? NO    2. Have you seen or consulted any other health care providers outside of the [address] [place] [address] since your last visit? Include any pap smears or colon screening. YES, [facility] for bilat shoulder; PT for shoulder pain    Advanced Directive:   1. Do you have an Advanced Directive? NO    2. Would you like information on Advanced Directives? NO    Patient DECLINED flu vaccine.

## 2021-02-22 NOTE — PATIENT INSTRUCTIONS
Colorectal cancer screening-referral generated Diabetic eye exam-. Schedule a diabetic eye exam, have results sent here. Lab today further disposition pending lab results if indicated Continue current medications pending lab results Avoid dietary salt, starch and sugar and follow program of regular aerobic exercise Return for follow-up in 6 months, sooner with any problems

## 2021-02-23 LAB
A-G RATIO,AGRAT: 1.8 RATIO (ref 1.1–2.6)
ABSOLUTE LYMPHOCYTE COUNT, 10803: 2.4 K/UL (ref 1–4.8)
ALBUMIN SERPL-MCNC: 4.6 G/DL (ref 3.5–5)
ALP SERPL-CCNC: 69 U/L (ref 25–115)
ALT SERPL-CCNC: 18 U/L (ref 5–40)
ANION GAP SERPL CALC-SCNC: 14 MMOL/L (ref 3–15)
AST SERPL W P-5'-P-CCNC: 13 U/L (ref 10–37)
AVG GLU, 10930: 123 MG/DL (ref 91–123)
BASOPHILS # BLD: 0.1 K/UL (ref 0–0.2)
BASOPHILS NFR BLD: 1 % (ref 0–2)
BILIRUB SERPL-MCNC: 0.4 MG/DL (ref 0.2–1.2)
BILIRUB UR QL: NEGATIVE
BUN SERPL-MCNC: 15 MG/DL (ref 6–22)
CALCIUM SERPL-MCNC: 9.7 MG/DL (ref 8.4–10.5)
CHLORIDE SERPL-SCNC: 104 MMOL/L (ref 98–110)
CHOLEST SERPL-MCNC: 127 MG/DL (ref 110–200)
CLARITY: CLEAR
CO2 SERPL-SCNC: 23 MMOL/L (ref 20–32)
COLOR UR: YELLOW
CREAT SERPL-MCNC: 0.9 MG/DL (ref 0.5–1.2)
CREATININE, URINE: 126 MG/DL
EOSINOPHIL # BLD: 0.4 K/UL (ref 0–0.5)
EOSINOPHIL NFR BLD: 4 % (ref 0–6)
EPITHELIAL,EPSU: NORMAL /HPF (ref 0–2)
ERYTHROCYTE [DISTWIDTH] IN BLOOD BY AUTOMATED COUNT: 12.7 % (ref 10–15.5)
GFRAA, 66117: >60
GFRNA, 66118: >60
GLOBULIN,GLOB: 2.5 G/DL (ref 2–4)
GLUCOSE SERPL-MCNC: 84 MG/DL (ref 70–99)
GLUCOSE UR QL: NEGATIVE MG/DL
GRANULOCYTES,GRANS: 55 % (ref 40–75)
HBA1C MFR BLD HPLC: 5.9 % (ref 4.8–5.6)
HCT VFR BLD AUTO: 46.5 % (ref 39.3–51.6)
HCV AB SER IA-ACNC: NORMAL
HDLC SERPL-MCNC: 3.2 MG/DL (ref 0–5)
HDLC SERPL-MCNC: 40 MG/DL
HGB BLD-MCNC: 14.7 G/DL (ref 13.1–17.2)
HGB UR QL STRIP: NEGATIVE
KETONES UR QL STRIP.AUTO: NEGATIVE MG/DL
LDL/HDL RATIO,LDHD: 1.9
LDLC SERPL CALC-MCNC: 76 MG/DL (ref 50–99)
LEUKOCYTE ESTERASE: NEGATIVE
LYMPHOCYTES, LYMLT: 29 % (ref 20–45)
MCH RBC QN AUTO: 32 PG (ref 26–34)
MCHC RBC AUTO-ENTMCNC: 32 G/DL (ref 31–36)
MCV RBC AUTO: 100 FL (ref 80–95)
MICROALB/CREAT RATIO, 140286: 11.9 (ref 0–30)
MICROALBUMIN,URINE RANDOM 140054: 15 MG/L (ref 0.1–17)
MONOCYTES # BLD: 0.8 K/UL (ref 0.1–1)
MONOCYTES NFR BLD: 10 % (ref 3–12)
NEUTROPHILS # BLD AUTO: 4.4 K/UL (ref 1.8–7.7)
NITRITE UR QL STRIP.AUTO: NEGATIVE
NON-HDL CHOLESTEROL, 011976: 87 MG/DL
PH UR STRIP: 5 PH (ref 5–8)
PLATELET # BLD AUTO: 352 K/UL (ref 140–440)
PMV BLD AUTO: 11.5 FL (ref 9–13)
POTASSIUM SERPL-SCNC: 4.5 MMOL/L (ref 3.5–5.5)
PROT SERPL-MCNC: 7.1 G/DL (ref 6.4–8.3)
PROT UR QL STRIP: NEGATIVE MG/DL
PSA SERPL-MCNC: 7.04 NG/ML
RBC # BLD AUTO: 4.66 M/UL (ref 3.8–5.8)
RBC #/AREA URNS HPF: NORMAL /HPF
SODIUM SERPL-SCNC: 141 MMOL/L (ref 133–145)
SP GR UR: 1.02 (ref 1–1.03)
TRIGL SERPL-MCNC: 56 MG/DL (ref 40–149)
URINE ASCORBIC ACID: NEGATIVE MG/DL
UROBILINOGEN UR STRIP-MCNC: <2 MG/DL
VLDLC SERPL CALC-MCNC: 11 MG/DL (ref 8–30)
WBC # BLD AUTO: 8 K/UL (ref 4–11)
WBC URNS QL MICRO: NORMAL /HPF (ref 0–2)

## 2021-02-24 NOTE — PROGRESS NOTES
Please advise that lab results show excellent control of diabetes and satisfactory cholesterol levels. The PSA screening test for prostate cancer however it is elevated which does not mean that we know he has prostate cancer but suggest the need for further evaluation. Please ask if he would be willing to accept referral for urology evaluation.

## 2021-07-21 ENCOUNTER — OFFICE VISIT (OUTPATIENT)
Dept: FAMILY MEDICINE CLINIC | Age: 51
End: 2021-07-21
Payer: MEDICAID

## 2021-07-21 VITALS
HEART RATE: 69 BPM | RESPIRATION RATE: 15 BRPM | BODY MASS INDEX: 26.33 KG/M2 | SYSTOLIC BLOOD PRESSURE: 110 MMHG | DIASTOLIC BLOOD PRESSURE: 60 MMHG | OXYGEN SATURATION: 97 % | HEIGHT: 69 IN | TEMPERATURE: 97.2 F | WEIGHT: 177.8 LBS

## 2021-07-21 DIAGNOSIS — R97.20 ELEVATED PSA: Primary | ICD-10-CM

## 2021-07-21 PROCEDURE — 99213 OFFICE O/P EST LOW 20 MIN: CPT | Performed by: FAMILY MEDICINE

## 2021-07-21 NOTE — PROGRESS NOTES
HISTORY OF PRESENT ILLNESS  Nafisa Flores is a 46 y.o. male. Mr. Abrahan Martinez returns for follow-up with regards: Elevated PSA. Routine lab revealed a PSA of 7.04 in February and he was referred for urology evaluation. Today he reports he is here because he went to a wellness clinic and was told that his PSA is 9.5 and he should see his primary care physician. Chart review indicates that he was informed of the elevated PSA and a urology referral which he agreed to in February. He indicates that he does not recall that now. He is asymptomatic.     Mr#: [id number]      Past Medical History:   Diagnosis Date    Chronic pain     right shoulder pain    Diabetes ([address]     Hypercholesterolemia     Hypertension     Stroke [facility])        Past Surgical History:   Procedure Laterality Date    COLONOSCOPY N/A 2021    SCREENING COLONOSCOPY w cold snare polypectomy-hyperplastic polyp-performed by Gurvinder Mccormack [facility] at [facility] ENDOSCOPY    HX WISDOM TEETH EXTRACTION         Family History   Problem Relation Age of Onset    Hypertension Father     Diabetes Father     Diabetes Paternal Grandmother        No Known Allergies    Social History     Tobacco Use   Smoking Status Former Smoker    Start date:     Quit date: 3/17/2019    Years since quittin.3   Smokeless Tobacco Never Used       Social History     Substance and Sexual Activity   Alcohol Use Not Currently           Patient Active Problem List   Diagnosis Code    Type 2 diabetes mellitus without complication, without long-term current use of insulin (HCC) E11.9    Essential hypertension I10    History of stroke Z86.73         Current Outpatient Medications:     atorvastatin (LIPITOR) 40 mg tablet, take 1 tablet by mouth daily, Disp: 90 Tab, Rfl: 3    metFORMIN (GLUCOPHAGE) 500 mg tablet, take 1 tablet by mouth twice a day with food, Disp: 180 Tab, Rfl: 3    glimepiride (AMARYL) 4 mg tablet, take 1 tablet by mouth every morning, Disp: 90 Tab, Rfl: 3   lisinopriL (PRINIVIL, ZESTRIL) 40 mg tablet, take 1 tablet by mouth once daily, Disp: 90 Tab, Rfl: 3    diclofenac EC (VOLTAREN) 50 mg EC tablet, take 1 tablet by mouth twice a day with food, Disp: 120 Tab, Rfl: 2       Review of Systems   Constitutional: Negative for fever and weight loss. Genitourinary: Negative for frequency, hematuria and urgency. Visit Vitals  /60 (BP 1 Location: Left upper arm, BP Patient Position: Sitting, BP Cuff Size: Adult)   Pulse 69   Temp 97.2 °F (36.2 °C) (Temporal)   Resp 15   Ht 5' 9\" (1.753 m)   Wt 177 lb 12.8 oz (80.6 kg)   SpO2 97%   BMI 26.26 kg/m²       Physical Exam  Vitals and nursing note reviewed. Constitutional:       Appearance: Normal appearance. He is well-developed. HENT:      Head: Normocephalic. Eyes:      Extraocular Movements: Extraocular movements intact. Cardiovascular:      Rate and Rhythm: Normal rate. Neurological:      Mental Status: He is alert and oriented to person, place, and time. Psychiatric:         Behavior: Behavior normal.         ASSESSMENT and PLAN    ICD-10-CM ICD-9-CM    1. Elevated PSA  R97.20 790.93 REFERRAL TO UROLOGY   Assessment:  Type 2 diabetes stable with follow-up scheduled next month  Elevated PSA    Health maintenance:  COVID-19 immunization status declines  Diabetic eye exam    Plan:  Urology referral  Office follow-up in August with his in office hemoglobin A1c as scheduled  Schedule a diabetic eye exam, have results sent here.

## 2021-07-21 NOTE — PATIENT INSTRUCTIONS
Urology referral  Office follow-up in August with his in office hemoglobin A1c as scheduled  Schedule a diabetic eye exam, have results sent here.

## 2021-07-21 NOTE — PROGRESS NOTES
Jacob Paul is a 46 y.o. male (: 1970) presenting to address:    Chief Complaint   Patient presents with    Results     discuss lab results       Vitals:    21 0821   BP: 110/60   Pulse: 69   Resp: 15   Temp: 97.2 °F (36.2 °C)   TempSrc: Temporal   SpO2: 97%   Weight: 177 lb 12.8 oz (80.6 kg)   Height: 5' 9\" (1.753 m)   PainSc:   0 - No pain       Hearing/Vision:   No exam data present    Learning Assessment:     Learning Assessment 2019   PRIMARY LEARNER Patient   HIGHEST LEVEL OF EDUCATION - PRIMARY LEARNER  SOME COLLEGE   BARRIERS PRIMARY LEARNER NONE   CO-LEARNER CAREGIVER No   PRIMARY LANGUAGE ENGLISH    NEED No   LEARNER PREFERENCE PRIMARY DEMONSTRATION   ANSWERED BY patient   RELATIONSHIP SELF     Depression Screening:     3 most recent PHQ Screens 2021   PHQ Not Done -   Little interest or pleasure in doing things Not at all   Feeling down, depressed, irritable, or hopeless Not at all   Total Score PHQ 2 0     Fall Risk Assessment:     Fall Risk Assessment, last 12 mths 2021   Able to walk? Yes   Fall in past 12 months? 0   Do you feel unsteady? 0   Are you worried about falling 0     Abuse Screening:     Abuse Screening Questionnaire 2019   Do you ever feel afraid of your partner? N   Are you in a relationship with someone who physically or mentally threatens you? N   Is it safe for you to go home? Y     Coordination of Care Questionaire:   1. Have you been to the ER, urgent care clinic since your last visit? Hospitalized since your last visit? NO    2. Have you seen or consulted any other health care providers outside of the [address] [place] [address] since your last visit? Include any pap smears or colon screening. YES, testosterone replacement therapy    Advanced Directive:   1. Do you have an Advanced Directive? NO    2. Would you like information on Advanced Directives?  NO

## 2021-08-22 NOTE — PROGRESS NOTES
HISTORY OF PRESENT ILLNESS  Estuardo Van is a 46 y.o. male. He presents for follow-up with history of type 2 diabetes and hypertension with a history of previous stroke as well as elevated PSA referred to urology last month. Today he reports that he has a urology appointment scheduled on 2021. He notes that he is intermittently having some right shoulder pain again.   He was previously referred for physical therapy and had good results but has not been following the recommended home exercise program.    Mr#: [id number]      Past Medical History:   Diagnosis Date    Chronic pain     right shoulder pain    Diabetes ([address]     ETOH abuse     Gout     History of tobacco use     Hypercholesterolemia     Hypertension     Stroke [facility])        Past Surgical History:   Procedure Laterality Date    COLONOSCOPY N/A 2021    SCREENING COLONOSCOPY w cold snare polypectomy-hyperplastic polyp-performed by Rayne King MD at [address] [place] [address] HX WISDOM TEETH EXTRACTION         Family History   Problem Relation Age of Onset    Hypertension Father     Diabetes Father     Diabetes Paternal Grandmother        No Known Allergies    Social History     Tobacco Use   Smoking Status Former Smoker    Types: Cigarettes    Start date:     Quit date: 3/17/2019    Years since quittin.4   Smokeless Tobacco Never Used       Social History     Substance and Sexual Activity   Alcohol Use Not Currently           Patient Active Problem List   Diagnosis Code    Type 2 diabetes mellitus without complication, without long-term current use of insulin (HCC) E11.9    Essential hypertension I10    History of stroke Z86.73         Current Outpatient Medications:     atorvastatin (LIPITOR) 40 mg tablet, take 1 tablet by mouth daily, Disp: 90 Tab, Rfl: 3    metFORMIN (GLUCOPHAGE) 500 mg tablet, take 1 tablet by mouth twice a day with food, Disp: 180 Tab, Rfl: 3    glimepiride (AMARYL) 4 mg tablet, take 1 tablet by mouth every morning, Disp: 90 Tab, Rfl: 3    lisinopriL (PRINIVIL, ZESTRIL) 40 mg tablet, take 1 tablet by mouth once daily, Disp: 90 Tab, Rfl: 3    diclofenac EC (VOLTAREN) 50 mg EC tablet, take 1 tablet by mouth twice a day with food, Disp: 120 Tab, Rfl: 2       Review of Systems   Constitutional: Negative for fever and weight loss. Eyes: Negative for blurred vision. Respiratory: Negative for shortness of breath. Cardiovascular: Negative for chest pain and palpitations. Gastrointestinal: Negative for abdominal pain. Genitourinary: Negative for frequency. Musculoskeletal: Positive for joint pain (episodic pain with abduction, right shoulder). Neurological: Negative for dizziness, tingling, speech change, focal weakness and headaches. Endo/Heme/Allergies: Negative for polydipsia. Visit Vitals  /86 (BP 1 Location: Left upper arm, BP Patient Position: Sitting, BP Cuff Size: Adult)   Pulse 73   Temp 97.4 °F (36.3 °C) (Temporal)   Resp 17   Ht 5' 9\" (1.753 m)   Wt 180 lb 9.6 oz (81.9 kg)   SpO2 97%   BMI 26.67 kg/m²       Physical Exam  Vitals and nursing note reviewed. Constitutional:       General: He is not in acute distress. Appearance: Normal appearance. He is not ill-appearing. HENT:      Head: Normocephalic. Eyes:      Extraocular Movements: Extraocular movements intact. Cardiovascular:      Rate and Rhythm: Normal rate and regular rhythm. Pulmonary:      Effort: Pulmonary effort is normal.      Breath sounds: Normal breath sounds. Skin:     General: Skin is warm and dry. Neurological:      Mental Status: He is alert and oriented to person, place, and time. Psychiatric:         Mood and Affect: Mood normal.         Behavior: Behavior normal.         Thought Content: Thought content normal.       Results for Campos Graves (MRN [id number]) as of 8/23/2021 10:01   Ref.  Range 8/23/2021 09:26   Hemoglobin A1c (POC) Latest Units: % 5.8     ASSESSMENT and PLAN    ICD-10-CM ICD-9-CM    1. Type 2 diabetes mellitus without complication, without long-term current use of insulin (HCC)  E11.9 250.00 AMB POC HEMOGLOBIN A1C      HEMOGLOBIN A1C WITH EAG      MICROALBUMIN, UR, RAND W/ MICROALB/CREAT RATIO   2. Essential hypertension  I10 401.9 CBC WITH AUTOMATED DIFF      URINALYSIS W/ RFLX MICROSCOPIC      METABOLIC PANEL, COMPREHENSIVE      LIPID PANEL   3. History of stroke  Z86.73 V12.54 CBC WITH AUTOMATED DIFF      LIPID PANEL   4. Elevated PSA  R97.20 790.93    Assessment:  Type 2 diabetes adequately controlled  Hypertension adequately controlled  History of stroke with no new symptoms  Elevated PSA, urology evaluation pending    Health maintenance considerations:  COVID-19 immunization  Diabetic eye exam  Shingrix immunization    Plan:  COVID-19 immunization strongly recommended-specifics related to immunizations and associated risks discussed at length  Diabetic eye exam recommended at least every 2 years  Shingrix immunization to reduce the risk of shingles available at the pharmacy  Continue current medications but decrease Amaryl (glimepiride) 4 mg to 1/2 tablet daily, once the present supply has run out call and a prescription will be sent for 2 mg tablets  Avoid dietary salt, starch and sugar and follow program of regular aerobic exercise  Urology follow-up as scheduled on 8/27/2021  Return for lab appointment followed by office evaluation appointment in February or sooner with any problems    Pippa Foley MD      PLEASE NOTE:   This document has been produced using voice recognition software. Unrecognized errors in transcription may be present.

## 2021-08-23 ENCOUNTER — OFFICE VISIT (OUTPATIENT)
Dept: FAMILY MEDICINE CLINIC | Age: 51
End: 2021-08-23
Payer: MEDICAID

## 2021-08-23 VITALS
DIASTOLIC BLOOD PRESSURE: 86 MMHG | SYSTOLIC BLOOD PRESSURE: 110 MMHG | HEART RATE: 73 BPM | RESPIRATION RATE: 17 BRPM | WEIGHT: 180.6 LBS | OXYGEN SATURATION: 97 % | BODY MASS INDEX: 26.75 KG/M2 | HEIGHT: 69 IN | TEMPERATURE: 97.4 F

## 2021-08-23 DIAGNOSIS — I10 ESSENTIAL HYPERTENSION: ICD-10-CM

## 2021-08-23 DIAGNOSIS — E11.9 TYPE 2 DIABETES MELLITUS WITHOUT COMPLICATION, WITHOUT LONG-TERM CURRENT USE OF INSULIN (HCC): Primary | ICD-10-CM

## 2021-08-23 DIAGNOSIS — Z86.73 HISTORY OF STROKE: ICD-10-CM

## 2021-08-23 DIAGNOSIS — R97.20 ELEVATED PSA: ICD-10-CM

## 2021-08-23 LAB — HBA1C MFR BLD HPLC: 5.8 %

## 2021-08-23 PROCEDURE — 83036 HEMOGLOBIN GLYCOSYLATED A1C: CPT | Performed by: FAMILY MEDICINE

## 2021-08-23 PROCEDURE — 99214 OFFICE O/P EST MOD 30 MIN: CPT | Performed by: FAMILY MEDICINE

## 2021-08-23 NOTE — PROGRESS NOTES
Татьяна Rodriguez presents today for   Chief Complaint   Patient presents with    Follow-up     Visit Vitals  /86 (BP 1 Location: Left upper arm, BP Patient Position: Sitting, BP Cuff Size: Adult)   Pulse 73   Temp 97.4 °F (36.3 °C) (Temporal)   Resp 17   Ht 5' 9\" (1.753 m)   Wt 180 lb 9.6 oz (81.9 kg)   SpO2 97%   BMI 26.67 kg/m²       Is someone accompanying this pt? no    Is the patient using any DME equipment during OV? no    Depression Screening:  3 most recent PHQ Screens 8/23/2021   PHQ Not Done Patient Decline   Little interest or pleasure in doing things Not at all   Feeling down, depressed, irritable, or hopeless Not at all   Total Score PHQ 2 0       Learning Assessment:  Learning Assessment 6/13/2019   PRIMARY LEARNER Patient   HIGHEST LEVEL OF EDUCATION - PRIMARY LEARNER  SOME COLLEGE   BARRIERS PRIMARY LEARNER NONE   CO-LEARNER CAREGIVER No   PRIMARY LANGUAGE ENGLISH    NEED No   LEARNER PREFERENCE PRIMARY DEMONSTRATION   ANSWERED BY patient   RELATIONSHIP SELF       Travel Screening:    Travel Screening     Question   Response    In the last month, have you been in contact with someone who was confirmed or suspected to have Coronavirus / COVID-19? No / Unsure    Have you had a COVID-19 viral test in the last 14 days? No    Do you have any of the following new or worsening symptoms? None of these    Have you traveled internationally or domestically in the last month? No      Travel History   Travel since 07/23/21     No documented travel since 07/23/21          Health Maintenance reviewed and discussed and ordered per Provider. Health Maintenance Due   Topic Date Due    Eye Exam Retinal or Dilated  Never done    COVID-19 Vaccine (1) Never done    Shingrix Vaccine Age 50> (1 of 2) Never done   . Coordination of Care:  1. Have you been to the ER, urgent care clinic since your last visit? Hospitalized since your last visit? no    2.  Have you seen or consulted any other health care providers outside of the [address] [place] [address] since your last visit? Include any pap smears or colon screening.  no

## 2021-08-23 NOTE — PATIENT INSTRUCTIONS
COVID-19 immunization strongly recommended  Diabetic eye exam recommended at least every 2 years  Shingrix immunization to reduce the risk of shingles available at the pharmacy  Continue current medications but decrease Amaryl (glimepiride) 4 mg to 1/2 tablet daily, once the present supply has run out call and a prescription will be sent for 2 mg tablets  Avoid dietary salt, starch and sugar and follow program of regular aerobic exercise  Urology follow-up as scheduled on 8/27/2021  Return for lab appointment followed by office evaluation appointment in February or sooner with any problems

## 2022-03-19 DIAGNOSIS — E11.9 TYPE 2 DIABETES MELLITUS WITHOUT COMPLICATION, WITHOUT LONG-TERM CURRENT USE OF INSULIN (HCC): ICD-10-CM

## 2022-03-19 PROBLEM — Z86.73 HISTORY OF STROKE: Status: ACTIVE | Noted: 2019-07-10

## 2022-03-19 PROBLEM — I10 ESSENTIAL HYPERTENSION: Status: ACTIVE | Noted: 2019-07-10

## 2022-03-20 RX ORDER — ATORVASTATIN CALCIUM 40 MG/1
TABLET, FILM COATED ORAL
Qty: 90 TABLET | Refills: 1 | Status: SHIPPED | OUTPATIENT
Start: 2022-03-20 | End: 2022-07-27 | Stop reason: SDUPTHER

## 2022-03-26 DIAGNOSIS — I10 ESSENTIAL HYPERTENSION: ICD-10-CM

## 2022-03-26 DIAGNOSIS — E11.9 TYPE 2 DIABETES MELLITUS WITHOUT COMPLICATION, WITHOUT LONG-TERM CURRENT USE OF INSULIN (HCC): ICD-10-CM

## 2022-03-26 RX ORDER — METFORMIN HYDROCHLORIDE 500 MG/1
TABLET ORAL
Qty: 180 TABLET | Refills: 1 | Status: SHIPPED | OUTPATIENT
Start: 2022-03-26 | End: 2022-07-27 | Stop reason: SDUPTHER

## 2022-03-26 RX ORDER — LISINOPRIL 40 MG/1
TABLET ORAL
Qty: 90 TABLET | Refills: 1 | Status: SHIPPED | OUTPATIENT
Start: 2022-03-26 | End: 2022-07-27 | Stop reason: SDUPTHER

## 2022-03-26 RX ORDER — GLIMEPIRIDE 4 MG/1
TABLET ORAL
Qty: 90 TABLET | Refills: 1 | Status: SHIPPED | OUTPATIENT
Start: 2022-03-26 | End: 2022-07-27 | Stop reason: DRUGHIGH

## 2022-07-27 ENCOUNTER — OFFICE VISIT (OUTPATIENT)
Dept: FAMILY MEDICINE CLINIC | Age: 52
End: 2022-07-27
Payer: MEDICAID

## 2022-07-27 ENCOUNTER — APPOINTMENT (OUTPATIENT)
Dept: FAMILY MEDICINE CLINIC | Age: 52
End: 2022-07-27

## 2022-07-27 VITALS
WEIGHT: 171 LBS | TEMPERATURE: 98.1 F | BODY MASS INDEX: 25.33 KG/M2 | DIASTOLIC BLOOD PRESSURE: 80 MMHG | OXYGEN SATURATION: 95 % | RESPIRATION RATE: 16 BRPM | SYSTOLIC BLOOD PRESSURE: 114 MMHG | HEIGHT: 69 IN | HEART RATE: 90 BPM

## 2022-07-27 DIAGNOSIS — Z86.73 HISTORY OF STROKE: ICD-10-CM

## 2022-07-27 DIAGNOSIS — E11.9 TYPE 2 DIABETES MELLITUS WITHOUT COMPLICATION, WITHOUT LONG-TERM CURRENT USE OF INSULIN (HCC): ICD-10-CM

## 2022-07-27 DIAGNOSIS — I10 ESSENTIAL HYPERTENSION: ICD-10-CM

## 2022-07-27 DIAGNOSIS — Z85.46 PERSONAL HISTORY OF PROSTATE CANCER: ICD-10-CM

## 2022-07-27 DIAGNOSIS — Z00.00 ROUTINE GENERAL MEDICAL EXAMINATION AT A HEALTH CARE FACILITY: Primary | ICD-10-CM

## 2022-07-27 LAB — HBA1C MFR BLD HPLC: 5.8 %

## 2022-07-27 PROCEDURE — 90677 PCV20 VACCINE IM: CPT | Performed by: FAMILY MEDICINE

## 2022-07-27 PROCEDURE — 83036 HEMOGLOBIN GLYCOSYLATED A1C: CPT | Performed by: FAMILY MEDICINE

## 2022-07-27 PROCEDURE — 99396 PREV VISIT EST AGE 40-64: CPT | Performed by: FAMILY MEDICINE

## 2022-07-27 RX ORDER — METFORMIN HYDROCHLORIDE 500 MG/1
500 TABLET ORAL 2 TIMES DAILY WITH MEALS
Qty: 180 TABLET | Refills: 1 | Status: SHIPPED | OUTPATIENT
Start: 2022-07-27

## 2022-07-27 RX ORDER — GLIMEPIRIDE 2 MG/1
2 TABLET ORAL
Qty: 90 TABLET | Refills: 1 | Status: SHIPPED | OUTPATIENT
Start: 2022-07-27

## 2022-07-27 RX ORDER — GLIMEPIRIDE 4 MG/1
4 TABLET ORAL DAILY
Qty: 90 TABLET | Refills: 1 | Status: CANCELLED | OUTPATIENT
Start: 2022-07-27

## 2022-07-27 RX ORDER — LISINOPRIL 40 MG/1
40 TABLET ORAL DAILY
Qty: 90 TABLET | Refills: 1 | Status: SHIPPED | OUTPATIENT
Start: 2022-07-27

## 2022-07-27 RX ORDER — ATORVASTATIN CALCIUM 40 MG/1
TABLET, FILM COATED ORAL
Qty: 90 TABLET | Refills: 1 | Status: SHIPPED | OUTPATIENT
Start: 2022-07-27

## 2022-07-27 NOTE — PROGRESS NOTES
Marta Maynard is a 46 y.o. male (: 1970) presenting to address:    Chief Complaint   Patient presents with    Diabetes       Vitals:    22 1336   BP: 114/80   Pulse: 90   Resp: 16   Temp: 98.1 °F (36.7 °C)   TempSrc: Temporal   SpO2: 95%   Weight: 171 lb (77.6 kg)   Height: 5' 9\" (1.753 m)   PainSc:   0 - No pain       Hearing/Vision:   No results found. Learning Assessment:     Learning Assessment 2019   PRIMARY LEARNER Patient   HIGHEST LEVEL OF EDUCATION - PRIMARY LEARNER  SOME COLLEGE   BARRIERS PRIMARY LEARNER NONE   CO-LEARNER CAREGIVER No   PRIMARY LANGUAGE ENGLISH    NEED No   LEARNER PREFERENCE PRIMARY DEMONSTRATION   ANSWERED BY patient   RELATIONSHIP SELF     Depression Screening:     3 most recent PHQ Screens 2022   PHQ Not Done -   Little interest or pleasure in doing things Not at all   Feeling down, depressed, irritable, or hopeless Not at all   Total Score PHQ 2 0     Fall Risk Assessment:     Fall Risk Assessment, last 12 mths 2021   Able to walk? Yes   Fall in past 12 months? 0   Do you feel unsteady? 0   Are you worried about falling 0     Abuse Screening:     Abuse Screening Questionnaire 2019   Do you ever feel afraid of your partner? N   Are you in a relationship with someone who physically or mentally threatens you? N   Is it safe for you to go home? Y     ADL Assessment:   No flowsheet data found. Coordination of Care Questionaire:   1. \"Have you been to the ER, urgent care clinic since your last visit? Hospitalized since your last visit? \"Yes for surgery     2. \"Have you seen or consulted any other health care providers outside of the [address] [place] [address] since your last visit? \" Yes urology     3. For patients aged 39-70: Has the patient had a colonoscopy? Yes - no Care Gap present     I  Advanced Directive:   1. Do you have an Advanced Directive? NO    2. Would you like information on Advanced Directives?  NO  Prevnar 20 Immunization/s administered 7/27/2022 by Tameka Del Rio LPN with guardian's consent. Patient tolerated procedure well. No reactions noted.

## 2022-07-27 NOTE — PATIENT INSTRUCTIONS
Assessment:  Type 2 diabetes well controlled  Hypertension well controlled    Health maintenance recommendations:  COVID-19 immunization  PCV-20 pneumococcal immunization-today  Influenza immunization recommended fall 2022  Diabetic eye exam -please ask eye doctor to send a copy of the report to this office  Plan:  Lab studies ordered previously to be obtained today if there is lab availability, otherwise schedule lab appointment  Avoid dietary salt, starch and sugar and follow program of regular aerobic exercise  Continue current medications but decrease glimepiride to 2 mg daily  Return for follow-up with in office hemoglobin A1c in 6 months or sooner with any problems

## 2022-07-27 NOTE — PROGRESS NOTES
HISTORY OF PRESENT ILLNESS  James Mendoza is a 46 y.o. male.   He presents for health assessment, preventative care and follow-up with a history of type 2 diabetes, hypertension, previous stroke and previous radical prostatectomy for carcinoma the prostate followed by urology    Mr#: [id number]      Past Medical History:   Diagnosis Date    Chronic pain     right shoulder pain    Diabetes ([facility] [address]     Elevated PSA     ETOH abuse     Gout     History of tobacco use     Hypercholesterolemia     Hypertension     Stroke [facility])        Past Surgical History:   Procedure Laterality Date    COLONOSCOPY N/A 04/08/2021    SCREENING COLONOSCOPY w cold snare polypectomy-hyperplastic polyp-performed by Rich Josue MD at [facility]  01/06/2022    [address]    HX WISDOM TEETH EXTRACTION         Family History   Problem Relation Age of Onset    Hypertension Father     Diabetes Father     Diabetes Paternal Grandmother        No Known Allergies    Social History     Tobacco Use   Smoking Status Former    Types: Cigarettes    Start date: 1995    Quit date: 3/17/2019    Years since quitting: 3.3   Smokeless Tobacco Never       Social History     Substance and Sexual Activity   Alcohol Use Not Currently           Patient Active Problem List   Diagnosis Code    Type 2 diabetes mellitus without complication, without long-term current use of insulin ([facility] E11.9    Essential hypertension I10    History of stroke Z86.73         Current Outpatient Medications:     glimepiride (AMARYL) 4 mg tablet, take 1 tablet by mouth every morning, Disp: 90 Tablet, Rfl: 1    lisinopriL (PRINIVIL, ZESTRIL) 40 mg tablet, take 1 tablet by mouth once daily, Disp: 90 Tablet, Rfl: 1    metFORMIN (GLUCOPHAGE) 500 mg tablet, take 1 tablet by mouth twice a day with food, Disp: 180 Tablet, Rfl: 1    atorvastatin (LIPITOR) 40 mg tablet, take 1 tablet by mouth once daily, Disp: 90 Tablet, Rfl: 1 Review of Systems   Constitutional:  Negative for chills, fever and weight loss. HENT:  Negative for congestion, ear pain, hearing loss and sore throat. Eyes:  Negative for blurred vision and double vision. Respiratory:  Negative for cough, shortness of breath and wheezing. Cardiovascular:  Negative for chest pain, palpitations and leg swelling. Gastrointestinal:  Negative for abdominal pain, blood in stool, constipation, diarrhea, heartburn, melena, nausea and vomiting. Genitourinary:  Negative for dysuria and urgency. Musculoskeletal:  Negative for joint pain and myalgias. Skin:  Negative for itching and rash. Neurological:  Negative for dizziness, tingling, sensory change, focal weakness and headaches. Endo/Heme/Allergies:  Negative for environmental allergies. Psychiatric/Behavioral:  Negative for depression. The patient is not nervous/anxious and does not have insomnia. Visit Vitals  /80   Pulse 90   Temp 98.1 °F (36.7 °C) (Temporal)   Resp 16   Ht 5' 9\" (1.753 m)   Wt 171 lb (77.6 kg)   SpO2 95%   BMI 25.25 kg/m²       Physical Exam  Vitals and nursing note reviewed. Constitutional:       General: He is not in acute distress. Appearance: Normal appearance. He is not ill-appearing. HENT:      Head: Normocephalic. Right Ear: Tympanic membrane, ear canal and external ear normal.      Left Ear: Tympanic membrane, ear canal and external ear normal.   Eyes:      Extraocular Movements: Extraocular movements intact. Conjunctiva/sclera: Conjunctivae normal.      Pupils: Pupils are equal, round, and reactive to light. Neck:      Vascular: No carotid bruit. Cardiovascular:      Rate and Rhythm: Normal rate and regular rhythm. Heart sounds: Normal heart sounds. Pulmonary:      Effort: Pulmonary effort is normal.      Breath sounds: Normal breath sounds. Abdominal:      Palpations: Abdomen is soft. Tenderness: There is no abdominal tenderness. Musculoskeletal:         General: No deformity. Cervical back: Neck supple. Right lower leg: No edema. Left lower leg: No edema. Skin:     General: Skin is warm and dry. Neurological:      General: No focal deficit present. Mental Status: He is alert and oriented to person, place, and time. Psychiatric:         Mood and Affect: Mood normal.         Behavior: Behavior normal.        Diabetic foot exam performed by Tiesha Courtney MD     Measurement  Response Nurse Comment Physician Comment   Monofilament  R - normal sensation with micro filament  L - 5/6     Pulse DP R - 1+ (weak)  L - 2+ (normal)     Pulse TP R - 1+ (weak)  L - trace     Structural deformity R - None  L - None     Skin Integrity / Deformity R - None  L - None                 ASSESSMENT and PLAN    ICD-10-CM ICD-9-CM    1. Routine general medical examination at a health care facility  Z00.00 V70.0       2. Type 2 diabetes mellitus without complication, without long-term current use of insulin (HCC)  E11.9 250.00 AMB POC HEMOGLOBIN A1C      atorvastatin (LIPITOR) 40 mg tablet      metFORMIN (GLUCOPHAGE) 500 mg tablet      glimepiride (AMARYL) 2 mg tablet      PNEUMOCOCCAL, PCV20, PREVNAR 20, (AGE 18 YRS+), IM, PF      3. Essential hypertension  I10 401.9 lisinopriL (PRINIVIL, ZESTRIL) 40 mg tablet      4. History of stroke  Z86.73 V12.54       5.  Personal history of prostate cancer  Z85.46 V10.46       Assessment:  Type 2 diabetes well controlled  Hypertension well controlled    Health maintenance recommendations:  COVID-19 immunization  PCV-20 pneumococcal immunization-today  Influenza immunization recommended fall 2022  Diabetic eye exam -please ask eye doctor to send a copy of the report to this office  Plan:  Lab studies ordered previously to be obtained today if there is lab availability, otherwise schedule lab appointment  Avoid dietary salt, starch and sugar and follow program of regular aerobic exercise  Continue current medications but decrease glimepiride to 2 mg daily  Return for follow-up with in office hemoglobin A1c in 6 months or sooner with any problems    Beto Hopper MD      PLEASE NOTE:   This document has been produced using voice recognition software. Unrecognized errors in transcription may be present.

## 2022-07-28 LAB
A-G RATIO,AGRAT: 2.1 RATIO (ref 1.1–2.6)
ABSOLUTE LYMPHOCYTE COUNT, 10803: 3.3 K/UL (ref 1–4.8)
ALBUMIN SERPL-MCNC: 5.1 G/DL (ref 3.5–5)
ALP SERPL-CCNC: 77 U/L (ref 25–115)
ALT SERPL-CCNC: 37 U/L (ref 5–40)
ANION GAP SERPL CALC-SCNC: 13 MMOL/L (ref 3–15)
AST SERPL W P-5'-P-CCNC: 43 U/L (ref 10–37)
BASOPHILS # BLD: 0.1 K/UL (ref 0–0.2)
BASOPHILS NFR BLD: 1 % (ref 0–2)
BILIRUB SERPL-MCNC: 0.7 MG/DL (ref 0.2–1.2)
BILIRUB UR QL: NEGATIVE
BUN SERPL-MCNC: 23 MG/DL (ref 6–22)
CALCIUM SERPL-MCNC: 10.1 MG/DL (ref 8.4–10.5)
CHLORIDE SERPL-SCNC: 103 MMOL/L (ref 98–110)
CHOLEST SERPL-MCNC: 117 MG/DL (ref 110–200)
CLARITY: CLEAR
CO2 SERPL-SCNC: 23 MMOL/L (ref 20–32)
COLOR UR: YELLOW
CREAT SERPL-MCNC: 1.2 MG/DL (ref 0.5–1.2)
CREATININE, URINE: 270 MG/DL
EOSINOPHIL # BLD: 0.3 K/UL (ref 0–0.5)
EOSINOPHIL NFR BLD: 3 % (ref 0–6)
EPITHELIAL,EPSU: ABNORMAL /HPF (ref 0–2)
ERYTHROCYTE [DISTWIDTH] IN BLOOD BY AUTOMATED COUNT: 12.9 % (ref 10–15.5)
GLOBULIN,GLOB: 2.4 G/DL (ref 2–4)
GLOMERULAR FILTRATION RATE: >60 ML/MIN/1.73 SQ.M.
GLUCOSE SERPL-MCNC: 75 MG/DL (ref 70–99)
GLUCOSE UR QL: NEGATIVE MG/DL
GRANULOCYTES,GRANS: 63 % (ref 40–75)
HCT VFR BLD AUTO: 45.3 % (ref 39.3–51.6)
HDLC SERPL-MCNC: 2.9 MG/DL (ref 0–5)
HDLC SERPL-MCNC: 41 MG/DL
HGB BLD-MCNC: 15.4 G/DL (ref 13.1–17.2)
HGB UR QL STRIP: NEGATIVE
HYLINE CAST, 6014: ABNORMAL /LPF (ref 0–2)
KETONES UR QL STRIP.AUTO: NEGATIVE MG/DL
LDL/HDL RATIO,LDHD: 1.5
LDLC SERPL CALC-MCNC: 60 MG/DL (ref 50–99)
LEUKOCYTE ESTERASE: NEGATIVE
LYMPHOCYTES, LYMLT: 24 % (ref 20–45)
MCH RBC QN AUTO: 33 PG (ref 26–34)
MCHC RBC AUTO-ENTMCNC: 34 G/DL (ref 31–36)
MCV RBC AUTO: 97 FL (ref 80–95)
MICROALB/CREAT RATIO, 140286: 5.4 (ref 0–30)
MICROALBUMIN,URINE RANDOM 140054: 14.5 MG/L (ref 0.1–17)
MONOCYTES # BLD: 1.3 K/UL (ref 0.1–1)
MONOCYTES NFR BLD: 10 % (ref 3–12)
NEUTROPHILS # BLD AUTO: 8.5 K/UL (ref 1.8–7.7)
NITRITE UR QL STRIP.AUTO: NEGATIVE
NON-HDL CHOLESTEROL, 011976: 76 MG/DL
PH UR STRIP: 5 PH (ref 5–8)
PLATELET # BLD AUTO: 318 K/UL (ref 140–440)
PMV BLD AUTO: 11 FL (ref 9–13)
POTASSIUM SERPL-SCNC: 4.7 MMOL/L (ref 3.5–5.5)
PROT SERPL-MCNC: 7.5 G/DL (ref 6.4–8.3)
PROT UR QL STRIP: NEGATIVE MG/DL
RBC # BLD AUTO: 4.65 M/UL (ref 3.8–5.8)
RBC #/AREA URNS HPF: ABNORMAL /HPF
SODIUM SERPL-SCNC: 139 MMOL/L (ref 133–145)
SP GR UR: 1.03 (ref 1–1.03)
TRIGL SERPL-MCNC: 80 MG/DL (ref 40–149)
URINE ASCORBIC ACID: ABNORMAL MG/DL
UROBILINOGEN UR STRIP-MCNC: <2 MG/DL
VLDLC SERPL CALC-MCNC: 16 MG/DL (ref 8–30)
WBC # BLD AUTO: 13.6 K/UL (ref 4–11)
WBC URNS QL MICRO: ABNORMAL /HPF (ref 0–2)

## 2022-07-28 NOTE — PROGRESS NOTES
Please advise that lab results show excellent cholesterol levels on the current dose of atorvastatin which should be continued

## 2023-01-23 NOTE — PROGRESS NOTES
HISTORY OF PRESENT ILLNESS  Edgardo David is a 48 y.o. male. He returns for 6-month interval follow-up of type 2 diabetes and hypertension with a history of carcinoma the prostate followed by urology. Mr#: [id number]      Past Medical History:   Diagnosis Date    Chronic pain     right shoulder pain    Diabetes (HCC)     Elevated PSA     ETOH abuse     Gout     History of tobacco use     Hypercholesterolemia     Hypertension     Stroke [facility])        Past Surgical History:   Procedure Laterality Date    COLONOSCOPY N/A 04/08/2021    SCREENING COLONOSCOPY w cold snare polypectomy-hyperplastic polyp-performed by Comfort Anthony MD at [address] [place] [address]  01/06/2022    [address]    HX WISDOM TEETH EXTRACTION         Family History   Problem Relation Age of Onset    Hypertension Father     Diabetes Father     Diabetes Paternal Grandmother        No Known Allergies    Social History     Tobacco Use   Smoking Status Former    Types: Cigarettes    Start date: 1995    Quit date: 3/17/2019    Years since quitting: 3.8   Smokeless Tobacco Never       Social History     Substance and Sexual Activity   Alcohol Use Not Currently           Patient Active Problem List   Diagnosis Code    Type 2 diabetes mellitus without complication, without long-term current use of insulin ([facility] [address] E11.9    Essential hypertension I10    History of stroke Z86.73         Current Outpatient Medications:     metFORMIN (GLUCOPHAGE) 500 mg tablet, Take 1 Tablet by mouth two (2) times daily (with meals). , Disp: 180 Tablet, Rfl: 1    atorvastatin (LIPITOR) 40 mg tablet, take 1 tablet by mouth once daily, Disp: 90 Tablet, Rfl: 1    lisinopriL (PRINIVIL, ZESTRIL) 40 mg tablet, Take 1 Tablet by mouth daily. , Disp: 90 Tablet, Rfl: 1    sildenafil citrate (VIAGRA) 100 mg tablet, Take 1 Tablet by mouth daily as needed for Erectile Dysfunction. , Disp: 20 Tablet, Rfl: 3       Review of Systems Constitutional:  Negative for fever and weight loss. Eyes:  Negative for blurred vision. Cardiovascular:  Negative for chest pain, palpitations, orthopnea and leg swelling. Genitourinary:  Negative for frequency. Neurological:  Negative for dizziness, tingling and headaches. Endo/Heme/Allergies:  Negative for polydipsia. Visit Vitals  /74   Pulse 71   Temp 98.1 °F (36.7 °C) (Temporal)   Resp 16   Ht 5' 9\" (1.753 m)   Wt 183 lb (83 kg)   SpO2 98%   BMI 27.02 kg/m²       Physical Exam  Vitals and nursing note reviewed. Constitutional:       General: He is not in acute distress. Appearance: Normal appearance. He is well-developed. He is not ill-appearing. HENT:      Head: Normocephalic. Eyes:      Extraocular Movements: Extraocular movements intact. Cardiovascular:      Rate and Rhythm: Normal rate. Pulmonary:      Effort: Pulmonary effort is normal.   Neurological:      Mental Status: He is alert and oriented to person, place, and time. Psychiatric:         Mood and Affect: Mood normal.         Behavior: Behavior normal.       ASSESSMENT and PLAN    ICD-10-CM ICD-9-CM    1. Type 2 diabetes mellitus without complication, without long-term current use of insulin (HCC)  E11.9 250.00 AMB POC HEMOGLOBIN A1C      metFORMIN (GLUCOPHAGE) 500 mg tablet      atorvastatin (LIPITOR) 40 mg tablet      2. Essential hypertension  I10 401.9 lisinopriL (PRINIVIL, ZESTRIL) 40 mg tablet      3. History of stroke  Z86.73 V12.54       4.  Personal history of prostate cancer  Z85.46 V10.46       Assessment:  Type 2 diabetes well controlled  Hypertension well controlled    Health maintenance recommendations:  COVID-19 immunization with bivalent vaccine  Influenza immunization declined  Diabetic eye exam    Plan:  Stop glimepiride, continue other current medications  Continue to avoid dietary salt, starch and sugar and follow program of regular aerobic exercise  Urology follow-up as recommended by the consultant  Schedule lab appointment followed by annual physical exam appointment after 7/27/2023, return sooner with any problems  Please always arrive at least 15 minutes before your scheduled appointment time  Kathrin Diaz MD      PLEASE NOTE:   This document has been produced using voice recognition software. Unrecognized errors in transcription may be present.

## 2023-01-25 ENCOUNTER — OFFICE VISIT (OUTPATIENT)
Dept: FAMILY MEDICINE CLINIC | Age: 53
End: 2023-01-25
Payer: MEDICAID

## 2023-01-25 VITALS
WEIGHT: 183 LBS | TEMPERATURE: 98.1 F | HEART RATE: 71 BPM | OXYGEN SATURATION: 98 % | BODY MASS INDEX: 27.11 KG/M2 | HEIGHT: 69 IN | RESPIRATION RATE: 16 BRPM | SYSTOLIC BLOOD PRESSURE: 110 MMHG | DIASTOLIC BLOOD PRESSURE: 74 MMHG

## 2023-01-25 DIAGNOSIS — Z85.46 PERSONAL HISTORY OF PROSTATE CANCER: ICD-10-CM

## 2023-01-25 DIAGNOSIS — Z86.73 HISTORY OF STROKE: ICD-10-CM

## 2023-01-25 DIAGNOSIS — E11.9 TYPE 2 DIABETES MELLITUS WITHOUT COMPLICATION, WITHOUT LONG-TERM CURRENT USE OF INSULIN (HCC): Primary | ICD-10-CM

## 2023-01-25 DIAGNOSIS — I10 ESSENTIAL HYPERTENSION: ICD-10-CM

## 2023-01-25 LAB — HBA1C MFR BLD HPLC: 5.7 %

## 2023-01-25 PROCEDURE — 3044F HG A1C LEVEL LT 7.0%: CPT | Performed by: FAMILY MEDICINE

## 2023-01-25 PROCEDURE — 3078F DIAST BP <80 MM HG: CPT | Performed by: FAMILY MEDICINE

## 2023-01-25 PROCEDURE — 3074F SYST BP LT 130 MM HG: CPT | Performed by: FAMILY MEDICINE

## 2023-01-25 PROCEDURE — 83036 HEMOGLOBIN GLYCOSYLATED A1C: CPT | Performed by: FAMILY MEDICINE

## 2023-01-25 PROCEDURE — 99213 OFFICE O/P EST LOW 20 MIN: CPT | Performed by: FAMILY MEDICINE

## 2023-01-25 RX ORDER — ATORVASTATIN CALCIUM 40 MG/1
TABLET, FILM COATED ORAL
Qty: 90 TABLET | Refills: 1 | Status: SHIPPED | OUTPATIENT
Start: 2023-01-25

## 2023-01-25 RX ORDER — METFORMIN HYDROCHLORIDE 500 MG/1
500 TABLET ORAL 2 TIMES DAILY WITH MEALS
Qty: 180 TABLET | Refills: 1 | Status: SHIPPED | OUTPATIENT
Start: 2023-01-25

## 2023-01-25 RX ORDER — SILDENAFIL 100 MG/1
100 TABLET, FILM COATED ORAL
Qty: 20 TABLET | Refills: 3 | Status: SHIPPED | OUTPATIENT
Start: 2023-01-25

## 2023-01-25 RX ORDER — LISINOPRIL 40 MG/1
40 TABLET ORAL DAILY
Qty: 90 TABLET | Refills: 1 | Status: SHIPPED | OUTPATIENT
Start: 2023-01-25

## 2023-01-25 RX ORDER — GLIMEPIRIDE 2 MG/1
2 TABLET ORAL
Qty: 90 TABLET | Refills: 1 | Status: CANCELLED | OUTPATIENT
Start: 2023-01-25

## 2023-01-25 NOTE — PROGRESS NOTES
José Becah is a 48 y.o. male (: 1970) presenting to address:    Chief Complaint   Patient presents with    Immunization/Injection     Declined flu shot . Diabetes       Vitals:    23 0931   BP: 110/74   Pulse: 71   Resp: 16   Temp: 98.1 °F (36.7 °C)   TempSrc: Temporal   SpO2: 98%   Weight: 183 lb (83 kg)   Height: 5' 9\" (1.753 m)   PainSc:   0 - No pain       Hearing/Vision:   No results found. Learning Assessment:     Learning Assessment 2019   PRIMARY LEARNER Patient   HIGHEST LEVEL OF EDUCATION - PRIMARY LEARNER  SOME COLLEGE   BARRIERS PRIMARY LEARNER NONE   CO-LEARNER CAREGIVER No   PRIMARY LANGUAGE ENGLISH    NEED No   LEARNER PREFERENCE PRIMARY DEMONSTRATION   ANSWERED BY patient   RELATIONSHIP SELF     Depression Screening:     3 most recent PHQ Screens 2023   PHQ Not Done -   Little interest or pleasure in doing things Not at all   Feeling down, depressed, irritable, or hopeless Not at all   Total Score PHQ 2 0     Fall Risk Assessment:     Fall Risk Assessment, last 12 mths 2021   Able to walk? Yes   Fall in past 12 months? 0   Do you feel unsteady? 0   Are you worried about falling 0     Abuse Screening:     Abuse Screening Questionnaire 2019   Do you ever feel afraid of your partner? N   Are you in a relationship with someone who physically or mentally threatens you? N   Is it safe for you to go home? Y     ADL Assessment:   No flowsheet data found. Coordination of Care Questionaire:   1. \"Have you been to the ER, urgent care clinic since your last visit? Hospitalized since your last visit? \" No    2. \"Have you seen or consulted any other health care providers outside of the [address] [place] [address] since your last visit? \" No     3. For patients aged 39-70: Has the patient had a colonoscopy? Yes - no Care Gap present       Advanced Directive:   1. Do you have an Advanced Directive? NO    2. Would you like information on Advanced Directives? NO

## 2023-01-25 NOTE — PATIENT INSTRUCTIONS
Assessment:  Type 2 diabetes well controlled  Hypertension well controlled    Health maintenance recommendations:  COVID-19 immunization with bivalent vaccine  Influenza immunization declined  Diabetic eye exam    Plan:  Stop glimepiride, continue current medications  Continue to avoid dietary salt, starch and sugar and follow program of regular aerobic exercise  Urology follow-up as recommended by the consultant  Schedule lab appointment followed by annual physical exam appointment after 7/27/2023, return sooner with any problems  Please always arrive at least 15 minutes before your scheduled appointment time

## 2023-02-05 DIAGNOSIS — I10 ESSENTIAL HYPERTENSION: Primary | ICD-10-CM

## 2023-02-05 DIAGNOSIS — E11.9 TYPE 2 DIABETES MELLITUS WITHOUT COMPLICATION, WITHOUT LONG-TERM CURRENT USE OF INSULIN (HCC): Primary | ICD-10-CM

## 2023-02-07 DIAGNOSIS — I10 ESSENTIAL HYPERTENSION: Primary | ICD-10-CM

## 2023-02-07 DIAGNOSIS — E11.9 TYPE 2 DIABETES MELLITUS WITHOUT COMPLICATION, WITHOUT LONG-TERM CURRENT USE OF INSULIN (HCC): Primary | ICD-10-CM

## 2023-07-13 RX ORDER — LISINOPRIL 40 MG/1
TABLET ORAL
Qty: 90 TABLET | Refills: 3 | Status: SHIPPED | OUTPATIENT
Start: 2023-07-13 | End: 2023-08-01 | Stop reason: SDUPTHER

## 2023-07-13 RX ORDER — ATORVASTATIN CALCIUM 40 MG/1
TABLET, FILM COATED ORAL
Qty: 90 TABLET | Refills: 3 | Status: SHIPPED | OUTPATIENT
Start: 2023-07-13 | End: 2023-08-01 | Stop reason: SDUPTHER

## 2023-07-13 NOTE — TELEPHONE ENCOUNTER
Last refilled 1/25/23 for 90 with 1 Atorvastatin , Metformin, Lisinopril .  Last ov 1/25/23   Future Appointments   Date Time Provider [address]   7/25/2023  7:20 AM LAB_[facility] BS AMB   8/1/2023  7:30 AM Ho Broussard MD [facility] BS AMB   8/31/2023 11:30 AM Ronel Schaefer MD [facility] Sched   10/31/2023 10:10 AM [facility] NURSE [facility] [place] Sched   11/7/2023  9:00 AM Melia Garcia PA-C [facility]

## 2023-07-26 LAB
BILIRUB SERPL-MCNC: NEGATIVE MG/DL
BLOOD: NEGATIVE
CLARITY: CLEAR
COLOR: YELLOW
GLUCOSE: NEGATIVE MG/DL
KETONES, URINE: NEGATIVE MG/DL
LEUKOCYTE ESTERASE, URINE: NEGATIVE
NITRITE, URINE: NEGATIVE
PH, URINE: 6 PH (ref 5–8)
PROTEIN UA: NEGATIVE MG/DL
SPECIFIC GRAVITY: 1.02 (ref 1–1.03)
UROBILINOGEN: 0.2 MG/DL

## 2023-07-31 ASSESSMENT — ENCOUNTER SYMPTOMS
COUGH: 0
ANAL BLEEDING: 0
BLOOD IN STOOL: 0
DIARRHEA: 0
VOMITING: 0
SORE THROAT: 0
WHEEZING: 0
ABDOMINAL PAIN: 0
CHEST TIGHTNESS: 0
NAUSEA: 0
SHORTNESS OF BREATH: 0
CONSTIPATION: 0

## 2023-08-01 ENCOUNTER — OFFICE VISIT (OUTPATIENT)
Dept: FAMILY MEDICINE CLINIC | Facility: CLINIC | Age: 53
End: 2023-08-01
Payer: MEDICAID

## 2023-08-01 VITALS
DIASTOLIC BLOOD PRESSURE: 60 MMHG | TEMPERATURE: 97.6 F | SYSTOLIC BLOOD PRESSURE: 104 MMHG | RESPIRATION RATE: 16 BRPM | BODY MASS INDEX: 27.4 KG/M2 | OXYGEN SATURATION: 97 % | HEIGHT: 69 IN | HEART RATE: 68 BPM | WEIGHT: 185 LBS

## 2023-08-01 DIAGNOSIS — I10 ESSENTIAL HYPERTENSION: ICD-10-CM

## 2023-08-01 DIAGNOSIS — Z86.73 HISTORY OF STROKE: ICD-10-CM

## 2023-08-01 DIAGNOSIS — E11.65 TYPE 2 DIABETES MELLITUS WITH HYPERGLYCEMIA, WITHOUT LONG-TERM CURRENT USE OF INSULIN (HCC): ICD-10-CM

## 2023-08-01 DIAGNOSIS — Z85.46 PERSONAL HISTORY OF MALIGNANT NEOPLASM OF PROSTATE: ICD-10-CM

## 2023-08-01 DIAGNOSIS — R13.12 OROPHARYNGEAL DYSPHAGIA: ICD-10-CM

## 2023-08-01 DIAGNOSIS — Z00.00 ROUTINE GENERAL MEDICAL EXAMINATION AT A HEALTH CARE FACILITY: Primary | ICD-10-CM

## 2023-08-01 PROCEDURE — 99396 PREV VISIT EST AGE 40-64: CPT | Performed by: FAMILY MEDICINE

## 2023-08-01 PROCEDURE — 3078F DIAST BP <80 MM HG: CPT | Performed by: FAMILY MEDICINE

## 2023-08-01 PROCEDURE — 99214 OFFICE O/P EST MOD 30 MIN: CPT | Performed by: FAMILY MEDICINE

## 2023-08-01 PROCEDURE — 3074F SYST BP LT 130 MM HG: CPT | Performed by: FAMILY MEDICINE

## 2023-08-01 RX ORDER — ATORVASTATIN CALCIUM 40 MG/1
40 TABLET, FILM COATED ORAL DAILY
Qty: 90 TABLET | Refills: 3 | Status: SHIPPED | OUTPATIENT
Start: 2023-08-01

## 2023-08-01 RX ORDER — LISINOPRIL 40 MG/1
40 TABLET ORAL DAILY
Qty: 90 TABLET | Refills: 3 | Status: SHIPPED | OUTPATIENT
Start: 2023-08-01

## 2023-08-01 SDOH — ECONOMIC STABILITY: FOOD INSECURITY: WITHIN THE PAST 12 MONTHS, YOU WORRIED THAT YOUR FOOD WOULD RUN OUT BEFORE YOU GOT MONEY TO BUY MORE.: NEVER TRUE

## 2023-08-01 SDOH — ECONOMIC STABILITY: HOUSING INSECURITY
IN THE LAST 12 MONTHS, WAS THERE A TIME WHEN YOU DID NOT HAVE A STEADY PLACE TO SLEEP OR SLEPT IN A SHELTER (INCLUDING NOW)?: NO

## 2023-08-01 SDOH — ECONOMIC STABILITY: INCOME INSECURITY: HOW HARD IS IT FOR YOU TO PAY FOR THE VERY BASICS LIKE FOOD, HOUSING, MEDICAL CARE, AND HEATING?: NOT VERY HARD

## 2023-08-01 SDOH — ECONOMIC STABILITY: FOOD INSECURITY: WITHIN THE PAST 12 MONTHS, THE FOOD YOU BOUGHT JUST DIDN'T LAST AND YOU DIDN'T HAVE MONEY TO GET MORE.: NEVER TRUE

## 2023-08-01 NOTE — PATIENT INSTRUCTIONS
Current Status:  Type 2 diabetes now in poor control  Hypertension well controlled  Oral pharyngeal phase dysphagia with history of stroke  Concerns about testosterone level    Health Maintenance Recommendations:  COVID-19 immunization with bivalent vaccine  Influenza immunization recommended fall 2023  Diabetic eye exam    Plan:  Barium swallow with video for further evaluation of swallowing difficulty  Continue current medications and add Farxiga 5 mg daily, call if unable to fill the prescription  Discussed concerns about testosterone levels with neurology consultant  Avoid dietary salt, starch and sugar and as much as possible follow program of regular aerobic exercise. Return for follow-up with an in office hemoglobin A1c in 3 months, return sooner with any problems  Please always arrive at least 15 minutes before your scheduled appointment time.

## 2023-08-31 PROBLEM — R41.0 DISORIENTATION: Status: ACTIVE | Noted: 2017-03-10

## 2023-08-31 PROBLEM — E78.00 HIGH CHOLESTEROL: Status: ACTIVE | Noted: 2023-08-31

## 2023-08-31 PROBLEM — E11.9 DIABETES (HCC): Status: ACTIVE | Noted: 2023-08-31

## 2023-08-31 PROBLEM — M10.9 GOUT: Status: ACTIVE | Noted: 2023-08-31

## 2023-08-31 PROBLEM — R53.1 RIGHT SIDED WEAKNESS: Status: ACTIVE | Noted: 2023-08-31

## 2023-08-31 PROBLEM — I63.9 ACUTE CVA (CEREBROVASCULAR ACCIDENT) (HCC): Status: ACTIVE | Noted: 2019-03-17

## 2023-08-31 PROBLEM — C61 PROSTATE CANCER (HCC): Status: ACTIVE | Noted: 2022-01-10

## 2023-08-31 PROBLEM — I63.50 POSTERIOR CIRCULATION STROKE (HCC): Status: ACTIVE | Noted: 2019-03-17

## 2023-10-30 ASSESSMENT — ENCOUNTER SYMPTOMS
CHEST TIGHTNESS: 0
SHORTNESS OF BREATH: 0

## 2023-11-01 ENCOUNTER — OFFICE VISIT (OUTPATIENT)
Dept: FAMILY MEDICINE CLINIC | Facility: CLINIC | Age: 53
End: 2023-11-01
Payer: MEDICAID

## 2023-11-01 VITALS
DIASTOLIC BLOOD PRESSURE: 86 MMHG | RESPIRATION RATE: 14 BRPM | WEIGHT: 181 LBS | BODY MASS INDEX: 26.81 KG/M2 | SYSTOLIC BLOOD PRESSURE: 132 MMHG | TEMPERATURE: 97.3 F | OXYGEN SATURATION: 99 % | HEIGHT: 69 IN | HEART RATE: 80 BPM

## 2023-11-01 DIAGNOSIS — E11.65 TYPE 2 DIABETES MELLITUS WITH HYPERGLYCEMIA, WITHOUT LONG-TERM CURRENT USE OF INSULIN (HCC): Primary | ICD-10-CM

## 2023-11-01 DIAGNOSIS — R19.8 SYMPTOMS OF GASTROESOPHAGEAL REFLUX: ICD-10-CM

## 2023-11-01 DIAGNOSIS — Z23 IMMUNIZATION DUE: ICD-10-CM

## 2023-11-01 DIAGNOSIS — Z85.46 PERSONAL HISTORY OF MALIGNANT NEOPLASM OF PROSTATE: ICD-10-CM

## 2023-11-01 DIAGNOSIS — I10 ESSENTIAL HYPERTENSION: ICD-10-CM

## 2023-11-01 DIAGNOSIS — Z86.73 HISTORY OF STROKE: ICD-10-CM

## 2023-11-01 LAB — HBA1C MFR BLD: 8.4 %

## 2023-11-01 PROCEDURE — 83036 HEMOGLOBIN GLYCOSYLATED A1C: CPT | Performed by: FAMILY MEDICINE

## 2023-11-01 PROCEDURE — 3075F SYST BP GE 130 - 139MM HG: CPT | Performed by: FAMILY MEDICINE

## 2023-11-01 PROCEDURE — 3052F HG A1C>EQUAL 8.0%<EQUAL 9.0%: CPT | Performed by: FAMILY MEDICINE

## 2023-11-01 PROCEDURE — 90739 HEPB VACC 2/4 DOSE ADULT IM: CPT | Performed by: FAMILY MEDICINE

## 2023-11-01 PROCEDURE — 3079F DIAST BP 80-89 MM HG: CPT | Performed by: FAMILY MEDICINE

## 2023-11-01 PROCEDURE — 99214 OFFICE O/P EST MOD 30 MIN: CPT | Performed by: FAMILY MEDICINE

## 2023-11-01 PROCEDURE — 90471 IMMUNIZATION ADMIN: CPT | Performed by: FAMILY MEDICINE

## 2023-11-01 RX ORDER — FAMOTIDINE 20 MG/1
20 TABLET, FILM COATED ORAL 2 TIMES DAILY
Qty: 180 TABLET | Refills: 3 | Status: SHIPPED | OUTPATIENT
Start: 2023-11-01

## 2023-11-01 NOTE — PATIENT INSTRUCTIONS
Current Status:  Type 2 diabetes remains in poor control  Hypertension adequately controlled  Describes reflux symptoms, also intermittent sore throat  Urology follow-up of carcinoma the prostate-up to date    Health Maintenance Recommendations:  Keep current with COVID-19 immunization guidelines  Hepatitis B immunization series-#1 today, #2 at follow-up  Consider the Shingrix immunization to reduce risk of shingles available at the pharmacy  Influenza immunization-declines  Routine HIV screening  Diabetic eye exam    Plan:  Avoid dietary salt, starch and sugar and as much as possible follow program of regular aerobic exercise. Urology follow-up as recommended by the consultant  Continue current medications but increase Farxiga to 10 mg daily  Begin famotidine 20 mg twice daily to prevent reflux symptoms  Return for follow-up with an in office hemoglobin A1c in 5 months, return sooner with any problems  Annual physical exam due after 8/1/2024  Please always arrive at least 15 minutes before your scheduled appointment time.

## 2023-11-02 RX ORDER — DAPAGLIFLOZIN 5 MG/1
5 TABLET, FILM COATED ORAL EVERY MORNING
Qty: 90 TABLET | OUTPATIENT
Start: 2023-11-02

## 2024-02-04 NOTE — PROGRESS NOTES
HISTORY OF PRESENT ILLNESS  Luis Alberto Mackenzie  is a 54 y.o. y.o. male    He returns for follow-up of type 2 diabetes found to be in suboptimal control with a hemoglobin A1c of 8.4% at his most recent evaluation 3 months ago.  He was asked to increase Farxiga to 10 mg daily.  He also reported significant symptoms of gastroesophageal reflux and was started on famotidine 20 mg twice daily.  Today he reports taking all medications as prescribed.  He has decreased soft drink consumption.                 Mr#: [id number]      Past Medical History:   Diagnosis Date    Chronic pain     right shoulder pain    Diabetes (HCC)     Elevated PSA     ETOH abuse     Gout     History of tobacco use     Hypercholesterolemia     Hypertension     Prostate cancer (HCC) 1/10/2022    Stroke (HCC)        Past Surgical History:   Procedure Laterality Date    COLONOSCOPY N/A 2021    SCREENING COLONOSCOPY w cold snare polypectomy-hyperplastic polyp-performed by Julio Singh MD at [facility] ENDOSCOPY    PROSTATECTOMY  2022    DAVINCI LAPAROSCOPIC PROSTATECTOMY RETROPUBIC RADICAL- DCK    WISDOM TOOTH EXTRACTION         Family History   Problem Relation Age of Onset    Diabetes Father     Hypertension Father     Diabetes Paternal Grandmother        No Known Allergies    Social History     Tobacco Use   Smoking Status Former    Current packs/day: 0.00    Average packs/day: 0.5 packs/day for 24.2 years (12.1 ttl pk-yrs)    Types: Cigarettes    Start date: 1995    Quit date: 3/17/2019    Years since quittin.8   Smokeless Tobacco Never       Social History     Substance and Sexual Activity   Alcohol Use Not Currently       Immunization History   Administered Date(s) Administered    Hep B, HEPLISAV-B, (age 18y+), IM, 0.5mL 2023    Pneumococcal, PCV20, PREVNAR 20, (age 6w+), IM, 0.5mL 2022    Pneumococcal, PPSV23, PNEUMOVAX 23, (age 2y+), SC/IM, 0.5mL 2019    TDaP, ADACEL (age 10y-64y), BOOSTRIX (age 10y+), IM, 0.5mL

## 2024-02-06 ENCOUNTER — OFFICE VISIT (OUTPATIENT)
Dept: FAMILY MEDICINE CLINIC | Facility: CLINIC | Age: 54
End: 2024-02-06
Payer: MEDICAID

## 2024-02-06 VITALS
HEIGHT: 69 IN | HEART RATE: 88 BPM | TEMPERATURE: 98 F | DIASTOLIC BLOOD PRESSURE: 60 MMHG | BODY MASS INDEX: 26.81 KG/M2 | SYSTOLIC BLOOD PRESSURE: 110 MMHG | WEIGHT: 181 LBS | RESPIRATION RATE: 16 BRPM | OXYGEN SATURATION: 98 %

## 2024-02-06 DIAGNOSIS — E11.9 TYPE 2 DIABETES MELLITUS WITHOUT COMPLICATION, WITHOUT LONG-TERM CURRENT USE OF INSULIN (HCC): Primary | ICD-10-CM

## 2024-02-06 DIAGNOSIS — I10 ESSENTIAL HYPERTENSION: ICD-10-CM

## 2024-02-06 DIAGNOSIS — R19.8 SYMPTOMS OF GASTROESOPHAGEAL REFLUX: ICD-10-CM

## 2024-02-06 LAB — HBA1C MFR BLD: 8.6 %

## 2024-02-06 PROCEDURE — 3078F DIAST BP <80 MM HG: CPT | Performed by: FAMILY MEDICINE

## 2024-02-06 PROCEDURE — 3074F SYST BP LT 130 MM HG: CPT | Performed by: FAMILY MEDICINE

## 2024-02-06 PROCEDURE — 90471 IMMUNIZATION ADMIN: CPT | Performed by: FAMILY MEDICINE

## 2024-02-06 PROCEDURE — 90739 HEPB VACC 2/4 DOSE ADULT IM: CPT | Performed by: FAMILY MEDICINE

## 2024-02-06 PROCEDURE — 83036 HEMOGLOBIN GLYCOSYLATED A1C: CPT | Performed by: FAMILY MEDICINE

## 2024-02-06 PROCEDURE — 99214 OFFICE O/P EST MOD 30 MIN: CPT | Performed by: FAMILY MEDICINE

## 2024-02-06 RX ORDER — METFORMIN HYDROCHLORIDE 500 MG/1
1000 TABLET, EXTENDED RELEASE ORAL 2 TIMES DAILY WITH MEALS
Qty: 180 TABLET | Refills: 2 | Status: SHIPPED | OUTPATIENT
Start: 2024-02-06

## 2024-02-06 ASSESSMENT — PATIENT HEALTH QUESTIONNAIRE - PHQ9
SUM OF ALL RESPONSES TO PHQ QUESTIONS 1-9: 0
SUM OF ALL RESPONSES TO PHQ9 QUESTIONS 1 & 2: 0
SUM OF ALL RESPONSES TO PHQ QUESTIONS 1-9: 0
1. LITTLE INTEREST OR PLEASURE IN DOING THINGS: 0
2. FEELING DOWN, DEPRESSED OR HOPELESS: 0

## 2024-02-06 NOTE — PROGRESS NOTES
Luis Alberto Mackenzie is a 54 y.o. male (: 1970) presenting to address:    Chief Complaint   Patient presents with    Diabetes       Vitals:    24 0733   BP: 110/60   Pulse: 88   Resp: 16   Temp: 98 °F (36.7 °C)   SpO2: 98%       \"Have you been to the ER, urgent care clinic since your last visit?  Hospitalized since your last visit?\"    NO    “Have you seen or consulted any other health care providers outside of [facility] since your last visit?”    Yes urology        Hep B #2 Immunization/s administered 2024 by Luz Gomez LPN   Patient tolerated procedure well.  No reactions noted.

## 2024-02-06 NOTE — PATIENT INSTRUCTIONS
Current Status:  Type 2 diabetes not adequately controlled, hemoglobin A1c has worsened now at 8.6%  Reflux symptoms improved on famotidine  Hypertension well-controlled    Health Maintenance Recommendations:  Keep current with COVID-19 immunization guidelines  Hepatitis B immunization series- #2 today  Consider the Shingrix immunization to reduce risk of shingles available at the pharmacy  Influenza immunization-declines  Routine HIV screening  Diabetic eye exam    Plan:  Increase metformin 500 ER from 1 to 2 tablets twice daily with food  Recommend not taking creatine as it has potential adverse effect on the kidneys  Recommend more cardio type exercise as opposed to heavy weightlifting  Return for follow-up with an in office hemoglobin A1c in 3 months, return sooner with any problems  Due for a fasting lab appointment followed by an annual physical after 8/1/2024.  Please always arrive at least 15 minutes before your scheduled appointment time.     Statement Selected

## 2024-03-22 DIAGNOSIS — E11.65 TYPE 2 DIABETES MELLITUS WITH HYPERGLYCEMIA, WITHOUT LONG-TERM CURRENT USE OF INSULIN (HCC): ICD-10-CM

## 2024-03-22 RX ORDER — DAPAGLIFLOZIN 10 MG/1
10 TABLET, FILM COATED ORAL EVERY MORNING
Qty: 90 TABLET | Refills: 3 | Status: SHIPPED | OUTPATIENT
Start: 2024-03-22

## 2024-03-22 NOTE — TELEPHONE ENCOUNTER
Last refilled 11/1/23 for 90 with 3 refills. Last ov 2/6/24   Future Appointments   Date Time Provider Department Center   5/7/2024  8:30 AM [facility] NURSE [facility] [place] Sched   5/8/2024  7:30 AM Maykel Cevallos MD [facility] BS AMB   5/14/2024  8:20 AM Debbie Dumas PA [facility] [place] Sched

## 2024-05-07 ASSESSMENT — ENCOUNTER SYMPTOMS
CHEST TIGHTNESS: 0
SHORTNESS OF BREATH: 0

## 2024-05-07 NOTE — PROGRESS NOTES
HISTORY OF PRESENT ILLNESS  Luis Alberto Mackenzie  is a 54 y.o. y.o. male    He returns for follow-up of type 2 diabetes found to be in poor control 3 months ago with a hemoglobin A1c of 8.6% taking metformin  mg and Farxiga 5 mg daily.  Lifestyle modifications were discussed at length and it was agreed that the patient would increase the metformin ER from 1 to 2 tablets daily and the Farxiga to 10 mg daily in addition to working on the lifestyle modifications.    Today he reports that he has not stopped drinking soft drinks and has actually not modified his diet in any way.        Mr#: [id number]      Past Medical History:   Diagnosis Date    Chronic pain     right shoulder pain    Diabetes (HCC)     Elevated PSA     ETOH abuse     Gout     History of tobacco use     Hypercholesterolemia     Hypertension     Prostate cancer (HCC) 1/10/2022    Stroke (HCC)        Past Surgical History:   Procedure Laterality Date    COLONOSCOPY N/A 2021    SCREENING COLONOSCOPY w cold snare polypectomy-hyperplastic polyp-performed by Julio Singh MD at [facility] ENDOSCOPY    PROSTATECTOMY  2022    DAVINCI LAPAROSCOPIC PROSTATECTOMY RETROPUBIC RADICAL- DCK    WISDOM TOOTH EXTRACTION         Family History   Problem Relation Age of Onset    Diabetes Father     Hypertension Father     Diabetes Paternal Grandmother        No Known Allergies    Social History     Tobacco Use   Smoking Status Former    Current packs/day: 0.00    Average packs/day: 0.5 packs/day for 24.2 years (12.1 ttl pk-yrs)    Types: Cigarettes    Start date: 1995    Quit date: 3/17/2019    Years since quittin.1   Smokeless Tobacco Never       Social History     Substance and Sexual Activity   Alcohol Use Not Currently       Immunization History   Administered Date(s) Administered    Hep B, HEPLISAV-B, (age 18y+), IM, 0.5mL 2023, 2024    Pneumococcal, PCV20, PREVNAR 20, (age 6w+), IM, 0.5mL 2022    Pneumococcal, PPSV23, PNEUMOVAX 23, (age

## 2024-05-08 ENCOUNTER — OFFICE VISIT (OUTPATIENT)
Dept: FAMILY MEDICINE CLINIC | Facility: CLINIC | Age: 54
End: 2024-05-08
Payer: MEDICAID

## 2024-05-08 VITALS
HEIGHT: 69 IN | WEIGHT: 179 LBS | OXYGEN SATURATION: 98 % | RESPIRATION RATE: 16 BRPM | SYSTOLIC BLOOD PRESSURE: 110 MMHG | TEMPERATURE: 97.3 F | BODY MASS INDEX: 26.51 KG/M2 | DIASTOLIC BLOOD PRESSURE: 62 MMHG | HEART RATE: 83 BPM

## 2024-05-08 DIAGNOSIS — I10 ESSENTIAL HYPERTENSION: ICD-10-CM

## 2024-05-08 DIAGNOSIS — E11.9 TYPE 2 DIABETES MELLITUS WITHOUT COMPLICATION, WITHOUT LONG-TERM CURRENT USE OF INSULIN (HCC): Primary | ICD-10-CM

## 2024-05-08 LAB — HBA1C MFR BLD: 8.4 %

## 2024-05-08 PROCEDURE — 3078F DIAST BP <80 MM HG: CPT | Performed by: FAMILY MEDICINE

## 2024-05-08 PROCEDURE — 3074F SYST BP LT 130 MM HG: CPT | Performed by: FAMILY MEDICINE

## 2024-05-08 PROCEDURE — 99213 OFFICE O/P EST LOW 20 MIN: CPT | Performed by: FAMILY MEDICINE

## 2024-05-08 PROCEDURE — 90750 HZV VACC RECOMBINANT IM: CPT | Performed by: FAMILY MEDICINE

## 2024-05-08 PROCEDURE — 90471 IMMUNIZATION ADMIN: CPT | Performed by: FAMILY MEDICINE

## 2024-05-08 PROCEDURE — 83036 HEMOGLOBIN GLYCOSYLATED A1C: CPT | Performed by: FAMILY MEDICINE

## 2024-05-08 NOTE — PATIENT INSTRUCTIONS
Current Status:  Type 2 diabetes not adequately controlled with a hemoglobin A1c of 8.4% compared with 8.6% 3 months ago  Hypertension well-controlled    Health Maintenance Recommendations:  Keep current with COVID-19 immunization guidelines  Shingrix immunization series #1 today, #2 at follow-up in 3 months  Diabetic eye exam  Routine HIV screening and diabetic albumin to creatinine ratio with next lab draw    Plan:  Continue current medications  Increase effort to avoid dietary salt, starch and sugar and as much as possible follow program of regular aerobic exercise.  Schedule fasting lab appointment followed by an annual physical exam appointment in 3 months.  Return sooner with any problems  Please always arrive at least 15 minutes before your scheduled appointment time.

## 2024-05-08 NOTE — PROGRESS NOTES
Luis Alberto Mackenzie is a 54 y.o. male (: 1970) presenting to address:    Chief Complaint   Patient presents with    Diabetes       Vitals:    24 0725   BP: 110/62   Pulse: 83   Resp: 16   Temp: 97.3 °F (36.3 °C)   SpO2: 98%       \"Have you been to the ER, urgent care clinic since your last visit?  Hospitalized since your last visit?\"    NO    “Have you seen or consulted any other health care providers outside of [facility] since your last visit?”    NO

## 2024-05-08 NOTE — PROGRESS NOTES
Immunizations Administered       Name Date Dose Route    Zoster Recombinant (Shingrix) 5/8/2024 0.5 mL Intramuscular    Site: Deltoid- Left    Lot: [id number]    NDC: 39872-394-03

## 2024-07-08 DIAGNOSIS — E11.9 TYPE 2 DIABETES MELLITUS WITHOUT COMPLICATION, WITHOUT LONG-TERM CURRENT USE OF INSULIN (HCC): ICD-10-CM

## 2024-07-08 RX ORDER — METFORMIN HYDROCHLORIDE 500 MG/1
TABLET, EXTENDED RELEASE ORAL
Qty: 180 TABLET | Refills: 3 | Status: SHIPPED | OUTPATIENT
Start: 2024-07-08

## 2024-07-08 NOTE — TELEPHONE ENCOUNTER
Requested Prescriptions     Pending Prescriptions Disp Refills    metFORMIN (GLUCOPHAGE-XR) 500 MG extended release tablet [Pharmacy Med Name: METFORMIN ER 500MG 24HR TABS] 180 tablet 2     Sig: TAKE 2 TABLETS BY MOUTH WITH BREAKFAST AND EVENING MEAL      Last seen: 5/8/24  Next seen: 8/20/24    Last filled: 2/6/24 Qty 180 w/2 refills

## 2024-08-07 DIAGNOSIS — Z00.00 ROUTINE HEALTH MAINTENANCE: Primary | ICD-10-CM

## 2024-08-07 DIAGNOSIS — E11.65 TYPE 2 DIABETES MELLITUS WITH HYPERGLYCEMIA, WITHOUT LONG-TERM CURRENT USE OF INSULIN (HCC): ICD-10-CM

## 2024-08-07 DIAGNOSIS — I10 ESSENTIAL HYPERTENSION: ICD-10-CM

## 2024-08-07 DIAGNOSIS — Z11.4 ENCOUNTER FOR SCREENING FOR HIV: ICD-10-CM

## 2024-08-07 DIAGNOSIS — R19.8 SYMPTOMS OF GASTROESOPHAGEAL REFLUX: ICD-10-CM

## 2024-08-13 ENCOUNTER — LAB (OUTPATIENT)
Dept: FAMILY MEDICINE CLINIC | Facility: CLINIC | Age: 54
End: 2024-08-13

## 2024-08-13 DIAGNOSIS — E11.65 TYPE 2 DIABETES MELLITUS WITH HYPERGLYCEMIA, WITHOUT LONG-TERM CURRENT USE OF INSULIN (HCC): ICD-10-CM

## 2024-08-13 DIAGNOSIS — R19.8 SYMPTOMS OF GASTROESOPHAGEAL REFLUX: ICD-10-CM

## 2024-08-13 DIAGNOSIS — I10 ESSENTIAL HYPERTENSION: ICD-10-CM

## 2024-08-13 DIAGNOSIS — Z11.4 ENCOUNTER FOR SCREENING FOR HIV: ICD-10-CM

## 2024-08-13 DIAGNOSIS — Z00.00 ROUTINE HEALTH MAINTENANCE: ICD-10-CM

## 2024-08-13 RX ORDER — GLIMEPIRIDE 2 MG/1
2 TABLET ORAL
Qty: 30 TABLET | Status: CANCELLED | OUTPATIENT
Start: 2024-08-13

## 2024-08-14 LAB
A/G RATIO: 2 RATIO (ref 1.1–2.6)
ALBUMIN: 4.5 G/DL (ref 3.5–5)
ALP BLD-CCNC: 58 U/L (ref 25–115)
ALT SERPL-CCNC: 24 U/L (ref 5–40)
ANION GAP SERPL CALCULATED.3IONS-SCNC: 12 MMOL/L (ref 3–15)
AST SERPL-CCNC: 19 U/L (ref 10–37)
BASOPHILS ABSOLUTE: 0.1 K/UL (ref 0–0.2)
BASOPHILS RELATIVE PERCENT: 1 % (ref 0–2)
BILIRUB SERPL-MCNC: 0.4 MG/DL (ref 0.2–1.2)
BUN BLDV-MCNC: 21 MG/DL (ref 6–22)
CALCIUM SERPL-MCNC: 9.8 MG/DL (ref 8.4–10.5)
CHLORIDE BLD-SCNC: 102 MMOL/L (ref 98–110)
CHOLESTEROL, TOTAL: 108 MG/DL (ref 110–200)
CHOLESTEROL/HDL RATIO: 3.3 (ref 0–5)
CO2: 25 MMOL/L (ref 20–32)
CREAT SERPL-MCNC: 1 MG/DL (ref 0.5–1.2)
CREATININE URINE: 114 MG/DL
EOSINOPHIL # BLD: 6 % (ref 0–6)
EOSINOPHILS ABSOLUTE: 0.6 K/UL (ref 0–0.5)
ESTIMATED AVERAGE GLUCOSE: 183 MG/DL (ref 91–123)
GFR, ESTIMATED: >60 ML/MIN/1.73 SQ.M.
GLOBULIN: 2.2 G/DL (ref 2–4)
GLUCOSE: 140 MG/DL (ref 70–99)
HBA1C MFR BLD: 8 % (ref 4.8–5.6)
HCT VFR BLD CALC: 45.3 % (ref 39.3–51.6)
HDLC SERPL-MCNC: 33 MG/DL
HEMOGLOBIN: 14.7 G/DL (ref 13.1–17.2)
HIV -1/0/2 AG/AB WITH REFLEX: NON REACTIVE
HIV INTERPRETATION: NORMAL
LDL CHOLESTEROL: 49 MG/DL (ref 50–99)
LDL/HDL RATIO: 1.5
LYMPHOCYTES # BLD: 38 % (ref 20–45)
LYMPHOCYTES ABSOLUTE: 3.7 K/UL (ref 1–4.8)
MCH RBC QN AUTO: 31 PG (ref 26–34)
MCHC RBC AUTO-ENTMCNC: 33 G/DL (ref 31–36)
MCV RBC AUTO: 97 FL (ref 80–95)
MICROALB/CREAT RATIO (UG/MG CREAT.): NORMAL
MICROALBUMIN/CREAT 24H UR: <12 MG/L (ref 0.1–17)
MONOCYTES ABSOLUTE: 1.1 K/UL (ref 0.1–1)
MONOCYTES: 11 % (ref 3–12)
NEUTROPHILS ABSOLUTE: 4.2 K/UL (ref 1.8–7.7)
NEUTROPHILS: 44 % (ref 40–75)
NON-HDL CHOLESTEROL: 75 MG/DL
PDW BLD-RTO: 12.6 % (ref 10–15.5)
PLATELET # BLD: 302 K/UL (ref 140–440)
PMV BLD AUTO: 12.3 FL (ref 9–13)
POTASSIUM SERPL-SCNC: 5 MMOL/L (ref 3.5–5.5)
RBC # BLD: 4.69 M/UL (ref 3.8–5.8)
SODIUM BLD-SCNC: 139 MMOL/L (ref 133–145)
TOTAL PROTEIN: 6.7 G/DL (ref 6.4–8.3)
TRIGL SERPL-MCNC: 130 MG/DL (ref 40–149)
VLDLC SERPL CALC-MCNC: 26 MG/DL (ref 8–30)
WBC # BLD: 9.7 K/UL (ref 4–11)

## 2024-08-14 NOTE — TELEPHONE ENCOUNTER
Last refilled 7/27/22 for 90 with 1 refills. I don't see this on current medication list . Noted from 1/25/23 advised that patient stop this medication  . I called patient he said this was his mistake it was the farxiga that he needed which he got . But they only have been giving him 30 days at a time and its only this medication others are fine. I suggested he ask the pharmacy why this is they may want him to use the mail order or maybe they have a low stock the medication . Patient stated but every time I fill it why would that be I again told him to ask his pharmacy or he could call his insurance and found out why , because Dr Cevallos is sending the script in for 90 days so that is what they should be giving him unless one of the two issues above is why he is only getting 30 . He voiced understanding .  
Pt called requesting to get a refill sent to the [facility] on file. He also inquired why he had to keep contacting the office for refills and I advised I did not see this medication being filled this year.    Requested Prescriptions     Pending Prescriptions Disp Refills    glimepiride (AMARYL) 2 MG tablet 30 tablet      Sig: Take 1 tablet by mouth       
30726 Wound Check/Suture Removal (no E&M)

## 2024-08-19 PROBLEM — Z85.46 PERSONAL HISTORY OF MALIGNANT NEOPLASM OF PROSTATE: Status: ACTIVE | Noted: 2024-08-19

## 2024-08-19 ASSESSMENT — ENCOUNTER SYMPTOMS
SORE THROAT: 0
COUGH: 0
CHEST TIGHTNESS: 0
VOMITING: 0
DIARRHEA: 0
BLOOD IN STOOL: 0
WHEEZING: 0
CONSTIPATION: 0
ABDOMINAL PAIN: 0
NAUSEA: 0
ANAL BLEEDING: 0

## 2024-08-20 ENCOUNTER — OFFICE VISIT (OUTPATIENT)
Dept: FAMILY MEDICINE CLINIC | Facility: CLINIC | Age: 54
End: 2024-08-20
Payer: MEDICAID

## 2024-08-20 VITALS
SYSTOLIC BLOOD PRESSURE: 110 MMHG | HEART RATE: 70 BPM | BODY MASS INDEX: 26.07 KG/M2 | WEIGHT: 176 LBS | TEMPERATURE: 97.9 F | RESPIRATION RATE: 16 BRPM | DIASTOLIC BLOOD PRESSURE: 70 MMHG | OXYGEN SATURATION: 99 % | HEIGHT: 69 IN

## 2024-08-20 DIAGNOSIS — E11.65 TYPE 2 DIABETES MELLITUS WITH HYPERGLYCEMIA, WITHOUT LONG-TERM CURRENT USE OF INSULIN (HCC): ICD-10-CM

## 2024-08-20 DIAGNOSIS — I10 ESSENTIAL HYPERTENSION: ICD-10-CM

## 2024-08-20 DIAGNOSIS — Z85.46 PERSONAL HISTORY OF MALIGNANT NEOPLASM OF PROSTATE: ICD-10-CM

## 2024-08-20 DIAGNOSIS — Z00.00 ROUTINE GENERAL MEDICAL EXAMINATION AT A HEALTH CARE FACILITY: Primary | ICD-10-CM

## 2024-08-20 DIAGNOSIS — Z86.73 HISTORY OF STROKE: ICD-10-CM

## 2024-08-20 PROCEDURE — 90750 HZV VACC RECOMBINANT IM: CPT | Performed by: FAMILY MEDICINE

## 2024-08-20 PROCEDURE — 90471 IMMUNIZATION ADMIN: CPT | Performed by: FAMILY MEDICINE

## 2024-08-20 PROCEDURE — 3074F SYST BP LT 130 MM HG: CPT | Performed by: FAMILY MEDICINE

## 2024-08-20 PROCEDURE — 3078F DIAST BP <80 MM HG: CPT | Performed by: FAMILY MEDICINE

## 2024-08-20 PROCEDURE — 99396 PREV VISIT EST AGE 40-64: CPT | Performed by: FAMILY MEDICINE

## 2024-08-20 PROCEDURE — 99213 OFFICE O/P EST LOW 20 MIN: CPT | Performed by: FAMILY MEDICINE

## 2024-08-20 SDOH — ECONOMIC STABILITY: FOOD INSECURITY: WITHIN THE PAST 12 MONTHS, THE FOOD YOU BOUGHT JUST DIDN'T LAST AND YOU DIDN'T HAVE MONEY TO GET MORE.: NEVER TRUE

## 2024-08-20 SDOH — ECONOMIC STABILITY: INCOME INSECURITY: HOW HARD IS IT FOR YOU TO PAY FOR THE VERY BASICS LIKE FOOD, HOUSING, MEDICAL CARE, AND HEATING?: NOT HARD AT ALL

## 2024-08-20 SDOH — ECONOMIC STABILITY: FOOD INSECURITY: WITHIN THE PAST 12 MONTHS, YOU WORRIED THAT YOUR FOOD WOULD RUN OUT BEFORE YOU GOT MONEY TO BUY MORE.: NEVER TRUE

## 2024-08-20 ASSESSMENT — ENCOUNTER SYMPTOMS: SHORTNESS OF BREATH: 1

## 2024-08-20 NOTE — PROGRESS NOTES
Luis Alberto Mackenzie is a 54 y.o. male (: 1970) presenting to address:    Chief Complaint   Patient presents with    Annual Exam       Vitals:    24 0727   BP: 110/70   Pulse: 70   Resp: 16   Temp: 97.9 °F (36.6 °C)   SpO2: 99%       \"Have you been to the ER, urgent care clinic since your last visit?  Hospitalized since your last visit?\"    NO    “Have you seen or consulted any other health care providers outside of [facility] since your last visit?”    NO       Shingrix #2 Immunization/s administered 2024 by Luz Gomez LPN   Patient tolerated procedure well.  No reactions noted.          
PNEUMOVAX 23, (age 2y+), SC/IM, 0.5mL 06/13/2019    TDaP, ADACEL (age 10y-64y), BOOSTRIX (age 10y+), IM, 0.5mL 06/13/2019    Zoster Recombinant (Shingrix) 05/08/2024       Patient Active Problem List   Diagnosis    Type 2 diabetes mellitus without complication, without long-term current use of insulin (HCC)    Essential hypertension    History of stroke    Diabetes (HCC)    Disorientation    Gout    High cholesterol    Acute CVA (cerebrovascular accident) (HCC)    Posterior circulation stroke (HCC)    Prostate cancer (HCC)    Right sided weakness         Current Outpatient Medications:     metFORMIN (GLUCOPHAGE-XR) 500 MG extended release tablet, TAKE 2 TABLETS BY MOUTH WITH BREAKFAST AND EVENING MEAL, Disp: 180 tablet, Rfl: 3    dapagliflozin (FARXIGA) 10 MG tablet, Take 1 tablet by mouth every morning, Disp: 90 tablet, Rfl: 3    CREATINE PO, Take by mouth, Disp: , Rfl:     famotidine (PEPCID) 20 MG tablet, Take 1 tablet by mouth 2 times daily, Disp: 180 tablet, Rfl: 3    atorvastatin (LIPITOR) 40 MG tablet, Take 1 tablet by mouth daily, Disp: 90 tablet, Rfl: 3    lisinopril (PRINIVIL;ZESTRIL) 40 MG tablet, Take 1 tablet by mouth daily, Disp: 90 tablet, Rfl: 3      Review of Systems   Constitutional:  Negative for activity change, appetite change, fever and unexpected weight change.   HENT:  Negative for congestion, ear pain, hearing loss and sore throat.    Eyes:  Negative for visual disturbance.   Respiratory:  Positive for shortness of breath (sometimes). Negative for cough, chest tightness and wheezing.    Cardiovascular:  Negative for chest pain, palpitations and leg swelling.   Gastrointestinal:  Negative for abdominal pain, anal bleeding, blood in stool, constipation, diarrhea, nausea and vomiting.        Famotidine effective   Endocrine: Negative for polydipsia and polyuria.   Genitourinary:  Negative for difficulty urinating, dysuria and hematuria.   Musculoskeletal:  Negative for arthralgias and myalgias.

## 2024-08-29 ENCOUNTER — TELEPHONE (OUTPATIENT)
Dept: FAMILY MEDICINE CLINIC | Facility: CLINIC | Age: 54
End: 2024-08-29

## 2024-08-29 DIAGNOSIS — E11.9 TYPE 2 DIABETES MELLITUS WITHOUT COMPLICATION, WITHOUT LONG-TERM CURRENT USE OF INSULIN (HCC): Primary | ICD-10-CM

## 2024-08-29 NOTE — TELEPHONE ENCOUNTER
I will send a prescription for Trulicity 0.75 mg by subcutaneous injection weekly for 4 weeks.  Please advise the patient about this and assist him with scheduling an appointment for 1 month from the start of Trulicity.   Rotation Flap Text: The defect edges were debeveled with a #15 scalpel blade.  Given the location of the defect, shape of the defect and the proximity to free margins a rotation flap was deemed most appropriate.  Using a sterile surgical marker, an appropriate rotation flap was drawn incorporating the defect and placing the expected incisions within the relaxed skin tension lines where possible.    The area thus outlined was incised deep to adipose tissue with a #15 scalpel blade.  The skin margins were undermined to an appropriate distance in all directions utilizing iris scissors.

## 2024-08-29 NOTE — TELEPHONE ENCOUNTER
Tried to call patient no answer and voicemail box is full. Sent My Chart message with information .

## 2024-08-29 NOTE — TELEPHONE ENCOUNTER
I submitted Pa for Rybelsus patient has to try and fail 2 of the following Byetta,Trulicity or Victoza .

## 2024-10-04 DIAGNOSIS — R19.8 SYMPTOMS OF GASTROESOPHAGEAL REFLUX: ICD-10-CM

## 2024-10-04 DIAGNOSIS — I10 ESSENTIAL HYPERTENSION: ICD-10-CM

## 2024-10-04 RX ORDER — LISINOPRIL 40 MG/1
40 TABLET ORAL DAILY
Qty: 90 TABLET | Refills: 1 | Status: SHIPPED | OUTPATIENT
Start: 2024-10-04

## 2024-10-04 RX ORDER — FAMOTIDINE 20 MG/1
20 TABLET, FILM COATED ORAL 2 TIMES DAILY
Qty: 180 TABLET | Refills: 1 | Status: SHIPPED | OUTPATIENT
Start: 2024-10-04

## 2024-10-04 RX ORDER — ATORVASTATIN CALCIUM 40 MG/1
40 TABLET, FILM COATED ORAL DAILY
Qty: 90 TABLET | Refills: 1 | Status: SHIPPED | OUTPATIENT
Start: 2024-10-04

## 2024-10-04 NOTE — TELEPHONE ENCOUNTER
Last refilled 8/1/23 for 90 with 3 refills. atorvastatin , metformin, lisinopril, Pepcid 11/1/23 for 180 with 3 refills. Last ov 8/20/24   Future Appointments   Date Time Provider Department Center   11/20/2024  7:30 AM Maykel Cevallos MD [facility] BS ECC DEP   5/6/2025  9:40 AM [facility] CANCER BLOOD ROOM [facility] [place] Sched   5/14/2025  8:20 AM Debbie Dumas PA [facility] [place] Sched

## 2024-11-18 ASSESSMENT — ENCOUNTER SYMPTOMS
CHEST TIGHTNESS: 0
SHORTNESS OF BREATH: 0

## 2024-11-18 NOTE — PROGRESS NOTES
HISTORY OF PRESENT ILLNESS  Luis Alberto Mackenzie  is a 54 y.o. y.o. male    He returns for follow-up of type 2 diabetes and hypertension with a history of previous stroke and previous radical prostatectomy for carcinoma of the prostate followed by urology.  At evaluation 3 months ago the hemoglobin A1c was not indicative of adequate control at 8.0% on Farxiga 10 mg daily and metformin 500 mg twice daily.  The initial plan was to add Rybelsus however this was not covered by the patient's insurance plan and he was instead started on dulaglutide 0.75 mg by subcutaneous injection.  The necessity of compliance with recommended dietary modifications in order to achieve adequate control of type 2 diabetes was discussed at length.        Mr#: [id number]      Past Medical History:   Diagnosis Date    Chronic pain     right shoulder pain    Diabetes (HCC)     Elevated PSA     ETOH abuse     Gout     History of tobacco use     Hypercholesterolemia     Hypertension     Prostate cancer (HCC) 1/10/2022    Stroke (HCC)        Past Surgical History:   Procedure Laterality Date    COLONOSCOPY N/A 2021    SCREENING COLONOSCOPY w cold snare polypectomy-hyperplastic polyp-performed by Julio Singh MD at [facility] ENDOSCOPY    PROSTATECTOMY  2022    DAVINCI LAPAROSCOPIC PROSTATECTOMY RETROPUBIC RADICAL- DCK    WISDOM TOOTH EXTRACTION         Family History   Problem Relation Age of Onset    Diabetes Father     Hypertension Father     Diabetes Paternal Grandmother        No Known Allergies    Social History     Tobacco Use   Smoking Status Former    Current packs/day: 0.00    Average packs/day: 0.5 packs/day for 24.2 years (12.1 ttl pk-yrs)    Types: Cigarettes    Start date: 1995    Quit date: 3/17/2019    Years since quittin.6   Smokeless Tobacco Never       Social History     Substance and Sexual Activity   Alcohol Use Not Currently       Immunization History   Administered Date(s) Administered    Hep B, HEPLISAV-B, (age

## 2024-11-20 ENCOUNTER — OFFICE VISIT (OUTPATIENT)
Dept: FAMILY MEDICINE CLINIC | Facility: CLINIC | Age: 54
End: 2024-11-20
Payer: MEDICAID

## 2024-11-20 VITALS
BODY MASS INDEX: 25.48 KG/M2 | RESPIRATION RATE: 16 BRPM | HEART RATE: 67 BPM | WEIGHT: 172 LBS | HEIGHT: 69 IN | DIASTOLIC BLOOD PRESSURE: 80 MMHG | SYSTOLIC BLOOD PRESSURE: 120 MMHG | TEMPERATURE: 97.3 F | OXYGEN SATURATION: 98 %

## 2024-11-20 DIAGNOSIS — Z85.46 PERSONAL HISTORY OF MALIGNANT NEOPLASM OF PROSTATE: ICD-10-CM

## 2024-11-20 DIAGNOSIS — I10 ESSENTIAL HYPERTENSION: ICD-10-CM

## 2024-11-20 DIAGNOSIS — Z86.73 HISTORY OF STROKE: ICD-10-CM

## 2024-11-20 DIAGNOSIS — E11.65 TYPE 2 DIABETES MELLITUS WITH HYPERGLYCEMIA, WITHOUT LONG-TERM CURRENT USE OF INSULIN (HCC): Primary | ICD-10-CM

## 2024-11-20 LAB
HBA1C MFR BLD: 7.1 %
MICROALBUMIN URINE, POC: 10 MG/L

## 2024-11-20 PROCEDURE — 3079F DIAST BP 80-89 MM HG: CPT | Performed by: FAMILY MEDICINE

## 2024-11-20 PROCEDURE — 3052F HG A1C>EQUAL 8.0%<EQUAL 9.0%: CPT | Performed by: FAMILY MEDICINE

## 2024-11-20 PROCEDURE — 3074F SYST BP LT 130 MM HG: CPT | Performed by: FAMILY MEDICINE

## 2024-11-20 PROCEDURE — 99213 OFFICE O/P EST LOW 20 MIN: CPT | Performed by: FAMILY MEDICINE

## 2024-11-20 PROCEDURE — 83036 HEMOGLOBIN GLYCOSYLATED A1C: CPT | Performed by: FAMILY MEDICINE

## 2024-11-20 PROCEDURE — 82044 UR ALBUMIN SEMIQUANTITATIVE: CPT | Performed by: FAMILY MEDICINE

## 2024-11-20 NOTE — PATIENT INSTRUCTIONS
Current Status:  Type 2 diabetes much improved with an A1c of 7.1% down from 8.4% 6 months ago but still above goal  Hypertension well-controlled    Health Maintenance Recommendations:  Influenza immunization-declines  COVID-19 immunization booster-available at the pharmacy  Diabetic eye exam-referred    Plan:  Resume dulaglutide to .75 mg by subcutaneous injection weekly  Continue all other previously prescribed medications  Return for follow-up with an in office hemoglobin A1c in 3 months or sooner with any problems  Please always arrive at least 15 minutes before your scheduled appointment time.

## 2024-11-20 NOTE — PROGRESS NOTES
Luis Alberto Mackenzie is a 54 y.o. male (: 1970) presenting to address:    Chief Complaint   Patient presents with    Immunizations     Declined flu shot .     Diabetes     Only got a months worth of Trulicity and there were no refills on the script.     Eye Exam     Would like a referral for eye Dr to check vision for classes.        Vitals:    24 0734   BP: 120/80   Pulse: 67   Resp: 16   Temp: 97.3 °F (36.3 °C)   SpO2: 98%       \"Have you been to the ER, urgent care clinic since your last visit?  Hospitalized since your last visit?\"    NO    “Have you seen or consulted any other health care providers outside of [facility] since your last visit?”    NO

## 2024-12-15 DIAGNOSIS — E11.65 TYPE 2 DIABETES MELLITUS WITH HYPERGLYCEMIA, WITHOUT LONG-TERM CURRENT USE OF INSULIN (HCC): ICD-10-CM

## 2024-12-16 RX ORDER — DAPAGLIFLOZIN 10 MG/1
10 TABLET, FILM COATED ORAL EVERY MORNING
Qty: 90 TABLET | Refills: 3 | Status: SHIPPED | OUTPATIENT
Start: 2024-12-16

## 2024-12-16 NOTE — TELEPHONE ENCOUNTER
Last refilled 3/22/24 for 90 with 3 refills. Last ov 11/20/24   Future Appointments   Date Time Provider Department Center   2/25/2025  7:30 AM Maykel Cevallos MD [facility] BS ECC DEP   5/6/2025  9:40 AM [facility] CANCER BLOOD ROOM [facility] [place] Sched   5/14/2025  8:20 AM Debbie Dumas PA [facility] [place] Sched

## 2025-02-24 ASSESSMENT — ENCOUNTER SYMPTOMS
SHORTNESS OF BREATH: 0
CHEST TIGHTNESS: 0

## 2025-02-24 NOTE — PROGRESS NOTES
HISTORY OF PRESENT ILLNESS  Luis Alberto Mackenzie  is a 55 y.o. y.o. male    He returns for 3-month interval follow-up of type 2 diabetes with a history of hypertension and previous stroke found to be in suboptimal diabetic control with a hemoglobin A1c of 7.1% but improved and that the A1c had come down from 8.4% 6 months prior.  He was asked to resume dulaglutide 0.75 mg by subcutaneous injection every week as well as metformin 500 mg twice daily with food and Farxiga 10 mg daily.  Famotidine 20 mg twice daily, lisinopril 40 mg daily and atorvastatin 40 mg daily were also continued.  He was encouraged to increase diligence regarding recommended lifestyle modifications.        Mr#: [id number]      Past Medical History:   Diagnosis Date    Chronic pain     right shoulder pain    Diabetes (HCC)     Elevated PSA     ETOH abuse     Gout     History of tobacco use     Hypercholesterolemia     Hypertension     Prostate cancer (HCC) 1/10/2022    Stroke (HCC)        Past Surgical History:   Procedure Laterality Date    COLONOSCOPY N/A 2021    SCREENING COLONOSCOPY w cold snare polypectomy-hyperplastic polyp-performed by Julio Singh MD at [facility] ENDOSCOPY    PROSTATECTOMY  2022    DAVINCI LAPAROSCOPIC PROSTATECTOMY RETROPUBIC RADICAL- DCK    WISDOM TOOTH EXTRACTION         Family History   Problem Relation Age of Onset    Diabetes Father     Hypertension Father     Diabetes Paternal Grandmother        No Known Allergies    Social History     Tobacco Use   Smoking Status Former    Current packs/day: 0.00    Average packs/day: 0.5 packs/day for 24.2 years (12.1 ttl pk-yrs)    Types: Cigarettes    Start date: 1995    Quit date: 3/17/2019    Years since quittin.9   Smokeless Tobacco Never       Social History     Substance and Sexual Activity   Alcohol Use Not Currently       Immunization History   Administered Date(s) Administered    Hep B, HEPLISAV-B, (age 18y+), IM, 0.5mL 2023, 2024    Pneumococcal,

## 2025-02-25 ENCOUNTER — OFFICE VISIT (OUTPATIENT)
Dept: FAMILY MEDICINE CLINIC | Facility: CLINIC | Age: 55
End: 2025-02-25
Payer: MEDICAID

## 2025-02-25 ENCOUNTER — TELEPHONE (OUTPATIENT)
Dept: FAMILY MEDICINE CLINIC | Facility: CLINIC | Age: 55
End: 2025-02-25

## 2025-02-25 VITALS
BODY MASS INDEX: 25.33 KG/M2 | WEIGHT: 171 LBS | TEMPERATURE: 97.6 F | DIASTOLIC BLOOD PRESSURE: 60 MMHG | HEIGHT: 69 IN | SYSTOLIC BLOOD PRESSURE: 106 MMHG | RESPIRATION RATE: 16 BRPM | OXYGEN SATURATION: 97 % | HEART RATE: 67 BPM

## 2025-02-25 DIAGNOSIS — I10 ESSENTIAL HYPERTENSION: ICD-10-CM

## 2025-02-25 DIAGNOSIS — E11.9 TYPE 2 DIABETES MELLITUS WITHOUT COMPLICATION, WITHOUT LONG-TERM CURRENT USE OF INSULIN (HCC): Primary | ICD-10-CM

## 2025-02-25 LAB — HBA1C MFR BLD: 7.3 %

## 2025-02-25 PROCEDURE — 3078F DIAST BP <80 MM HG: CPT | Performed by: FAMILY MEDICINE

## 2025-02-25 PROCEDURE — 99213 OFFICE O/P EST LOW 20 MIN: CPT | Performed by: FAMILY MEDICINE

## 2025-02-25 PROCEDURE — 83036 HEMOGLOBIN GLYCOSYLATED A1C: CPT | Performed by: FAMILY MEDICINE

## 2025-02-25 PROCEDURE — 3074F SYST BP LT 130 MM HG: CPT | Performed by: FAMILY MEDICINE

## 2025-02-25 PROCEDURE — 3051F HG A1C>EQUAL 7.0%<8.0%: CPT | Performed by: FAMILY MEDICINE

## 2025-02-25 NOTE — PROGRESS NOTES
Luis Alberto Mackenzie is a 55 y.o. male (: 1970) presenting to address:    Chief Complaint   Patient presents with    Diabetes       Vitals:    25 0733   BP: 106/60   Pulse: 67   Resp: 16   Temp: 97.6 °F (36.4 °C)   SpO2: 97%       \"Have you been to the ER, urgent care clinic since your last visit?  Hospitalized since your last visit?\"    NO    “Have you seen or consulted any other health care providers outside of [facility] since your last visit?”    NO

## 2025-02-25 NOTE — TELEPHONE ENCOUNTER
I spoke with pharmacy they ran medication no issue but they have to order it and will notify patient when its ready for . I called and notified patient he voiced understanding.

## 2025-02-25 NOTE — PATIENT INSTRUCTIONS
Current Status:  Type 2 diabetes less well-controlled with a hemoglobin A1c of 7.3% up from 7.1% 3 months ago  Hypertension well-controlled    Health Maintenance Recommendations:  Influenza immunization recommended every fall  COVID-19 immunization booster available at the pharmacy  Diabetic eye exam  Colorectal cancer screening due April 2031    Plan:  Continue current medications-office staff will check with the pharmacy to see if we can determine the reason for difficulty refilling the dulaglutide (Trulicity)-you will be advised of the outcome and of the treatment plan  Avoid dietary salt, starch and sugar and as much as possible follow program of regular aerobic exercise.  Return for follow-up in 3 months with an in office hemoglobin A1c  Please schedule a lab appointment followed by an annual physical exam appointment after 8/20/2025  Return sooner with any problems  Please always arrive at least 15 minutes before your scheduled appointment time.

## 2025-02-25 NOTE — TELEPHONE ENCOUNTER
Please call the patient's pharmacy and determine the nature of the problem which has resulted and has not being able to fill his Trulicity prescription.

## 2025-03-17 DIAGNOSIS — I10 ESSENTIAL HYPERTENSION: ICD-10-CM

## 2025-03-18 RX ORDER — LISINOPRIL 40 MG/1
40 TABLET ORAL DAILY
Qty: 90 TABLET | Refills: 2 | Status: SHIPPED | OUTPATIENT
Start: 2025-03-18

## 2025-03-18 NOTE — TELEPHONE ENCOUNTER
Last refilled 10/4/24 for 90 with 1 refill. Last ov 2/25/25   Future Appointments   Date Time Provider Department Center   5/6/2025  9:40 AM [facility] CANCER BLOOD ROOM [facility] [place] Sched   5/14/2025  8:20 AM Debbie Dumas PA [facility] Sched   5/28/2025  7:30 AM Maykel Cevallos MD [facility] DEP

## 2025-03-24 DIAGNOSIS — R19.8 SYMPTOMS OF GASTROESOPHAGEAL REFLUX: ICD-10-CM

## 2025-03-24 DIAGNOSIS — I10 ESSENTIAL HYPERTENSION: ICD-10-CM

## 2025-03-25 RX ORDER — FAMOTIDINE 20 MG/1
20 TABLET, FILM COATED ORAL 2 TIMES DAILY
Qty: 180 TABLET | Refills: 1 | Status: SHIPPED | OUTPATIENT
Start: 2025-03-25

## 2025-03-25 RX ORDER — ATORVASTATIN CALCIUM 40 MG/1
40 TABLET, FILM COATED ORAL DAILY
Qty: 90 TABLET | Refills: 1 | Status: SHIPPED | OUTPATIENT
Start: 2025-03-25

## 2025-03-25 NOTE — TELEPHONE ENCOUNTER
Last refilled 10/4/24 for 90 days with 1 refills.last ov  2/25/25   Future Appointments   Date Time Provider Department Center   5/6/2025  9:40 AM [facility] CANCER BLOOD ROOM [facility] [place] Sched   5/14/2025  8:20 AM Debbie Dumas PA [facility] [place] Sched   5/28/2025  7:30 AM Maykel Cevallos MD [facility] DEP

## 2025-05-27 ASSESSMENT — ENCOUNTER SYMPTOMS
CHEST TIGHTNESS: 0
SHORTNESS OF BREATH: 0

## 2025-05-27 NOTE — PROGRESS NOTES
HISTORY OF PRESENT ILLNESS  Luis Alberto Mackenzie  is a 55 y.o. y.o. male    He returns for follow-up of type 2 diabetes and hypertension with a history of previous stroke after evaluation 3 months ago at which time diabetic control was found to have worsened with a hemoglobin A1c of 7.3%.  He indicated the he had had difficulty obtaining dulaglutide.  The pharmacy was contacted on his behalf and reported that there was no problem with the prescription or coverage but that they needed to order the medication.        Mr#: [id number]      Past Medical History:   Diagnosis Date    Chronic pain     right shoulder pain    Diabetes (HCC)     Elevated PSA     ETOH abuse     Gout     History of tobacco use     Hypercholesterolemia     Hypertension     Prostate cancer (HCC) 1/10/2022    Stroke (HCC)        Past Surgical History:   Procedure Laterality Date    COLONOSCOPY N/A 2021    SCREENING COLONOSCOPY w cold snare polypectomy-hyperplastic polyp-performed by Julio Singh MD at [facility] ENDOSCOPY    PROSTATECTOMY  2022    DAVINCI LAPAROSCOPIC PROSTATECTOMY RETROPUBIC RADICAL- DCK    WISDOM TOOTH EXTRACTION         Family History   Problem Relation Age of Onset    Diabetes Father     Hypertension Father     Diabetes Paternal Grandmother        No Known Allergies    Social History     Tobacco Use   Smoking Status Former    Current packs/day: 0.00    Average packs/day: 0.5 packs/day for 24.2 years (12.1 ttl pk-yrs)    Types: Cigarettes    Start date: 1995    Quit date: 3/17/2019    Years since quittin.2   Smokeless Tobacco Never       Social History     Substance and Sexual Activity   Alcohol Use Not Currently       Immunization History   Administered Date(s) Administered    Hep B, HEPLISAV-B, (age 18y+), IM, 0.5mL 2023, 2024    Pneumococcal, PCV20, PREVNAR 20, (age 6w+), IM, 0.5mL 2022    Pneumococcal, PPSV23, PNEUMOVAX 23, (age 2y+), SC/IM, 0.5mL 2019    TDaP, ADACEL (age 10y-64y), BOOSTRIX

## 2025-05-28 ENCOUNTER — OFFICE VISIT (OUTPATIENT)
Dept: FAMILY MEDICINE CLINIC | Facility: CLINIC | Age: 55
End: 2025-05-28
Payer: MEDICAID

## 2025-05-28 VITALS
SYSTOLIC BLOOD PRESSURE: 110 MMHG | TEMPERATURE: 97.7 F | HEIGHT: 69 IN | DIASTOLIC BLOOD PRESSURE: 70 MMHG | HEART RATE: 84 BPM | BODY MASS INDEX: 25.62 KG/M2 | OXYGEN SATURATION: 98 % | RESPIRATION RATE: 16 BRPM | WEIGHT: 173 LBS

## 2025-05-28 DIAGNOSIS — I10 ESSENTIAL HYPERTENSION: ICD-10-CM

## 2025-05-28 DIAGNOSIS — E11.65 TYPE 2 DIABETES MELLITUS WITH HYPERGLYCEMIA, WITHOUT LONG-TERM CURRENT USE OF INSULIN (HCC): Primary | ICD-10-CM

## 2025-05-28 DIAGNOSIS — Z86.73 HISTORY OF STROKE: ICD-10-CM

## 2025-05-28 LAB — HBA1C MFR BLD: 6.4 %

## 2025-05-28 PROCEDURE — 99213 OFFICE O/P EST LOW 20 MIN: CPT | Performed by: FAMILY MEDICINE

## 2025-05-28 PROCEDURE — 83036 HEMOGLOBIN GLYCOSYLATED A1C: CPT | Performed by: FAMILY MEDICINE

## 2025-05-28 PROCEDURE — 3078F DIAST BP <80 MM HG: CPT | Performed by: FAMILY MEDICINE

## 2025-05-28 PROCEDURE — 3074F SYST BP LT 130 MM HG: CPT | Performed by: FAMILY MEDICINE

## 2025-05-28 PROCEDURE — 3044F HG A1C LEVEL LT 7.0%: CPT | Performed by: FAMILY MEDICINE

## 2025-05-28 RX ORDER — DAPAGLIFLOZIN 10 MG/1
10 TABLET, FILM COATED ORAL EVERY MORNING
Qty: 90 TABLET | Refills: 3 | Status: SHIPPED | OUTPATIENT
Start: 2025-05-28

## 2025-05-28 NOTE — PATIENT INSTRUCTIONS
Results for orders placed or performed in visit on 05/28/25   AMB POC HEMOGLOBIN A1C   Result Value Ref Range    Hemoglobin A1C, POC 6.4 %         Current Status:  Type 2 diabetes well-controlled with a hemoglobin A1c now 6.4% down from 7.3% 3 months ago  Hypertension well-controlled    Health Maintenance Recommendations:  Influenza immunization every fall  COVID-19 immunization booster available at the pharmacy  Diabetic retinal exam    Plan:  Continue current medications.   Please schedule a lab appointment followed by an annual physical exam appointment after 8/20/2025, return sooner with any problems  Please always arrive at least 15 minutes before your scheduled appointment time.

## 2025-05-28 NOTE — PROGRESS NOTES
Luis Alberto Mackenzie is a 55 y.o. male (: 1970) presenting to address:    Chief Complaint   Patient presents with    Diabetes       Vitals:    25 0730   BP: 110/70   Pulse: 84   Resp: 16   Temp: 97.7 °F (36.5 °C)   SpO2: 98%       \"Have you been to the ER, urgent care clinic since your last visit?  Hospitalized since your last visit?\"    NO    “Have you seen or consulted any other health care providers outside of [facility] since your last visit?”    NO

## 2025-08-07 PROBLEM — I63.50 POSTERIOR CIRCULATION STROKE (HCC): Status: RESOLVED | Noted: 2019-03-17 | Resolved: 2025-08-07

## 2025-08-07 PROBLEM — I63.9 ACUTE CVA (CEREBROVASCULAR ACCIDENT) (HCC): Status: RESOLVED | Noted: 2019-03-17 | Resolved: 2025-08-07

## 2025-08-10 DIAGNOSIS — R19.8 SYMPTOMS OF GASTROESOPHAGEAL REFLUX: ICD-10-CM

## 2025-08-10 DIAGNOSIS — Z00.00 ROUTINE HEALTH MAINTENANCE: Primary | ICD-10-CM

## 2025-08-10 DIAGNOSIS — E11.9 TYPE 2 DIABETES MELLITUS WITHOUT COMPLICATION, WITHOUT LONG-TERM CURRENT USE OF INSULIN (HCC): ICD-10-CM

## 2025-08-10 DIAGNOSIS — I10 ESSENTIAL HYPERTENSION: ICD-10-CM

## 2025-08-10 DIAGNOSIS — E78.00 HIGH CHOLESTEROL: ICD-10-CM

## 2025-08-20 ASSESSMENT — ENCOUNTER SYMPTOMS
BLOOD IN STOOL: 0
CONSTIPATION: 0
NAUSEA: 0
DIARRHEA: 0
WHEEZING: 0
ANAL BLEEDING: 0
ABDOMINAL PAIN: 0
VOMITING: 0
SORE THROAT: 0
CHEST TIGHTNESS: 0
COUGH: 0
SHORTNESS OF BREATH: 0

## 2025-08-21 ENCOUNTER — HOSPITAL ENCOUNTER (OUTPATIENT)
Facility: HOSPITAL | Age: 55
Setting detail: SPECIMEN
Discharge: HOME | End: 2025-08-24

## 2025-08-21 ENCOUNTER — OFFICE VISIT (OUTPATIENT)
Dept: FAMILY MEDICINE CLINIC | Facility: CLINIC | Age: 55
End: 2025-08-21
Payer: MEDICAID

## 2025-08-21 VITALS
OXYGEN SATURATION: 97 % | HEART RATE: 91 BPM | HEIGHT: 69 IN | TEMPERATURE: 97.7 F | SYSTOLIC BLOOD PRESSURE: 118 MMHG | WEIGHT: 174 LBS | BODY MASS INDEX: 25.77 KG/M2 | DIASTOLIC BLOOD PRESSURE: 80 MMHG | RESPIRATION RATE: 16 BRPM

## 2025-08-21 DIAGNOSIS — E11.9 TYPE 2 DIABETES MELLITUS WITHOUT COMPLICATION, WITHOUT LONG-TERM CURRENT USE OF INSULIN (HCC): ICD-10-CM

## 2025-08-21 DIAGNOSIS — M54.50 ACUTE MIDLINE LOW BACK PAIN WITHOUT SCIATICA: ICD-10-CM

## 2025-08-21 DIAGNOSIS — Z85.46 PERSONAL HISTORY OF MALIGNANT NEOPLASM OF PROSTATE: ICD-10-CM

## 2025-08-21 DIAGNOSIS — R19.8 SYMPTOMS OF GASTROESOPHAGEAL REFLUX: ICD-10-CM

## 2025-08-21 DIAGNOSIS — Z00.00 ROUTINE GENERAL MEDICAL EXAMINATION AT A HEALTH CARE FACILITY: Primary | ICD-10-CM

## 2025-08-21 DIAGNOSIS — Z86.73 HISTORY OF STROKE: ICD-10-CM

## 2025-08-21 DIAGNOSIS — I10 ESSENTIAL HYPERTENSION: ICD-10-CM

## 2025-08-21 LAB
A/G RATIO: 1.7 RATIO (ref 1.1–2.6)
ALBUMIN: 4.6 G/DL (ref 3.5–5)
ALP BLD-CCNC: 65 U/L (ref 25–115)
ALT SERPL-CCNC: 42 U/L (ref 5–40)
ANION GAP SERPL CALCULATED.3IONS-SCNC: 11 MMOL/L (ref 3–15)
AST SERPL-CCNC: 39 U/L (ref 10–37)
BASOPHILS # BLD: 1 % (ref 0–2)
BASOPHILS ABSOLUTE: 0.1 K/UL (ref 0–0.2)
BILIRUB SERPL-MCNC: 0.3 MG/DL (ref 0.2–1.2)
BILIRUBIN, URINE: NEGATIVE
BUN BLDV-MCNC: 27 MG/DL (ref 6–22)
CALCIUM SERPL-MCNC: 10 MG/DL (ref 8.4–10.5)
CHLORIDE BLD-SCNC: 103 MMOL/L (ref 98–110)
CHOLESTEROL, TOTAL: 137 MG/DL (ref 110–200)
CHOLESTEROL/HDL RATIO: 3.7 (ref 0–5)
CLARITY, UA: CLEAR
CO2: 24 MMOL/L (ref 20–32)
COLOR, UA: YELLOW
CREAT SERPL-MCNC: 1.3 MG/DL (ref 0.5–1.2)
EOSINOPHIL # BLD: 9 % (ref 0–6)
EOSINOPHILS ABSOLUTE: 0.7 K/UL (ref 0–0.5)
GFR, ESTIMATED: 62.8 ML/MIN/1.73 SQ.M.
GLOBULIN: 2.7 G/DL (ref 2–4)
GLUCOSE URINE: ABNORMAL MG/DL
GLUCOSE: 167 MG/DL (ref 70–99)
HBA1C MFR BLD: 6.3 %
HCT VFR BLD CALC: 47.2 % (ref 39.3–51.6)
HDLC SERPL-MCNC: 37 MG/DL
HEMOGLOBIN: 15.6 G/DL (ref 13.1–17.2)
KETONES, URINE: NEGATIVE MG/DL
LDL CHOLESTEROL: 77 MG/DL (ref 50–99)
LDL/HDL RATIO: 2.1
LEUKOCYTE ESTERASE, URINE: NEGATIVE
LYMPHOCYTES # BLD: 34 % (ref 20–45)
LYMPHOCYTES ABSOLUTE: 2.8 K/UL (ref 1–4.8)
MCH RBC QN AUTO: 32 PG (ref 26–34)
MCHC RBC AUTO-ENTMCNC: 33 G/DL (ref 31–36)
MCV RBC AUTO: 97 FL (ref 80–95)
MONOCYTES ABSOLUTE: 1 K/UL (ref 0.1–1)
MONOCYTES: 12 % (ref 3–12)
NEUTROPHILS ABSOLUTE: 3.7 K/UL (ref 1.8–7.7)
NEUTROPHILS SEGMENTED: 44 % (ref 40–75)
NITRITE, URINE: NEGATIVE
NON-HDL CHOLESTEROL: 100 MG/DL
OCCULT BLOOD,URINE: NEGATIVE
PDW BLD-RTO: 13 % (ref 10–15.5)
PH, URINE: 5 PH (ref 5–8)
PLATELET # BLD: 296 K/UL (ref 140–440)
PMV BLD AUTO: 11.3 FL (ref 9–13)
POTASSIUM SERPL-SCNC: 5.5 MMOL/L (ref 3.5–5.5)
PROTEIN, URINE: NEGATIVE MG/DL
RBC # BLD: 4.87 M/UL (ref 3.8–5.8)
SENTARA SPECIMEN COLLECTION: NORMAL
SODIUM BLD-SCNC: 138 MMOL/L (ref 133–145)
SPECIFIC GRAVITY UA: 1.03 (ref 1–1.03)
TOTAL PROTEIN: 7.3 G/DL (ref 6.4–8.3)
TRIGL SERPL-MCNC: 113 MG/DL (ref 40–149)
TSH SERPL DL<=0.05 MIU/L-ACNC: 1.83 MCU/ML (ref 0.27–4.2)
UROBILINOGEN, URINE: 0.2 MG/DL
VLDLC SERPL CALC-MCNC: 23 MG/DL (ref 8–30)
WBC # BLD: 8.3 K/UL (ref 4–11)

## 2025-08-21 PROCEDURE — 99001 SPECIMEN HANDLING PT-LAB: CPT

## 2025-08-21 PROCEDURE — 83036 HEMOGLOBIN GLYCOSYLATED A1C: CPT | Performed by: FAMILY MEDICINE

## 2025-08-21 PROCEDURE — 99213 OFFICE O/P EST LOW 20 MIN: CPT | Performed by: FAMILY MEDICINE

## 2025-08-21 PROCEDURE — 3074F SYST BP LT 130 MM HG: CPT | Performed by: FAMILY MEDICINE

## 2025-08-21 PROCEDURE — 3079F DIAST BP 80-89 MM HG: CPT | Performed by: FAMILY MEDICINE

## 2025-08-21 PROCEDURE — 99396 PREV VISIT EST AGE 40-64: CPT | Performed by: FAMILY MEDICINE

## 2025-08-21 RX ORDER — DAPAGLIFLOZIN 10 MG/1
10 TABLET, FILM COATED ORAL EVERY MORNING
Qty: 90 TABLET | Refills: 3 | Status: SHIPPED | OUTPATIENT
Start: 2025-08-21

## 2025-08-21 RX ORDER — LISINOPRIL 40 MG/1
40 TABLET ORAL DAILY
Qty: 90 TABLET | Refills: 3 | Status: SHIPPED | OUTPATIENT
Start: 2025-08-21

## 2025-08-21 RX ORDER — FAMOTIDINE 20 MG/1
20 TABLET, FILM COATED ORAL 2 TIMES DAILY
Qty: 180 TABLET | Refills: 3 | Status: SHIPPED | OUTPATIENT
Start: 2025-08-21

## 2025-08-21 RX ORDER — ATORVASTATIN CALCIUM 40 MG/1
40 TABLET, FILM COATED ORAL DAILY
Qty: 90 TABLET | Refills: 3 | Status: SHIPPED | OUTPATIENT
Start: 2025-08-21

## 2025-08-21 SDOH — ECONOMIC STABILITY: FOOD INSECURITY: WITHIN THE PAST 12 MONTHS, THE FOOD YOU BOUGHT JUST DIDN'T LAST AND YOU DIDN'T HAVE MONEY TO GET MORE.: NEVER TRUE

## 2025-08-21 SDOH — ECONOMIC STABILITY: FOOD INSECURITY: WITHIN THE PAST 12 MONTHS, YOU WORRIED THAT YOUR FOOD WOULD RUN OUT BEFORE YOU GOT MONEY TO BUY MORE.: NEVER TRUE

## 2025-08-21 ASSESSMENT — PATIENT HEALTH QUESTIONNAIRE - PHQ9
SUM OF ALL RESPONSES TO PHQ QUESTIONS 1-9: 0
SUM OF ALL RESPONSES TO PHQ QUESTIONS 1-9: 0
1. LITTLE INTEREST OR PLEASURE IN DOING THINGS: NOT AT ALL
SUM OF ALL RESPONSES TO PHQ QUESTIONS 1-9: 0
SUM OF ALL RESPONSES TO PHQ QUESTIONS 1-9: 0
2. FEELING DOWN, DEPRESSED OR HOPELESS: NOT AT ALL

## 2025-08-21 ASSESSMENT — ENCOUNTER SYMPTOMS: BACK PAIN: 1

## 2025-08-22 ENCOUNTER — TELEPHONE (OUTPATIENT)
Dept: FAMILY MEDICINE CLINIC | Facility: CLINIC | Age: 55
End: 2025-08-22

## 2025-08-22 LAB
CREATININE, URINE  MG/DL: 88 MG/DL
MICROALBUMIN/CREAT 24H UR: <12 MG/L (ref 0.1–17)
MICROALBUMIN/CREAT UR-RTO: NORMAL

## 2025-08-23 DIAGNOSIS — E78.00 HIGH CHOLESTEROL: Primary | ICD-10-CM

## 2025-08-23 DIAGNOSIS — R79.89 ELEVATED LFTS: ICD-10-CM

## 2025-08-23 DIAGNOSIS — E78.00 HIGH CHOLESTEROL: ICD-10-CM

## 2025-08-23 DIAGNOSIS — E11.9 TYPE 2 DIABETES MELLITUS WITHOUT COMPLICATION, WITHOUT LONG-TERM CURRENT USE OF INSULIN (HCC): Primary | ICD-10-CM

## 2025-08-23 DIAGNOSIS — I10 ESSENTIAL HYPERTENSION: ICD-10-CM

## 2025-08-23 DIAGNOSIS — E11.9 TYPE 2 DIABETES MELLITUS WITHOUT COMPLICATION, WITHOUT LONG-TERM CURRENT USE OF INSULIN (HCC): ICD-10-CM

## 2025-08-23 RX ORDER — EZETIMIBE 10 MG/1
10 TABLET ORAL DAILY
Qty: 90 TABLET | Refills: 1 | Status: SHIPPED | OUTPATIENT
Start: 2025-08-23